# Patient Record
Sex: FEMALE | URBAN - METROPOLITAN AREA
[De-identification: names, ages, dates, MRNs, and addresses within clinical notes are randomized per-mention and may not be internally consistent; named-entity substitution may affect disease eponyms.]

---

## 2017-03-23 ENCOUNTER — INPATIENT (INPATIENT)
Facility: HOSPITAL | Age: 32
LOS: 1 days | Discharge: HOME | End: 2017-03-25
Attending: OBSTETRICS & GYNECOLOGY | Admitting: OBSTETRICS & GYNECOLOGY

## 2017-06-27 DIAGNOSIS — Z3A.37 37 WEEKS GESTATION OF PREGNANCY: ICD-10-CM

## 2017-06-27 DIAGNOSIS — Z33.1 PREGNANT STATE, INCIDENTAL: ICD-10-CM

## 2019-08-22 ENCOUNTER — APPOINTMENT (OUTPATIENT)
Dept: ANTEPARTUM | Facility: CLINIC | Age: 34
End: 2019-08-22
Payer: MEDICAID

## 2019-08-22 ENCOUNTER — ASOB RESULT (OUTPATIENT)
Age: 34
End: 2019-08-22

## 2019-08-22 PROCEDURE — 76811 OB US DETAILED SNGL FETUS: CPT

## 2019-12-24 ENCOUNTER — INPATIENT (INPATIENT)
Facility: HOSPITAL | Age: 34
LOS: 0 days | Discharge: HOME | End: 2019-12-25
Attending: OBSTETRICS & GYNECOLOGY | Admitting: OBSTETRICS & GYNECOLOGY
Payer: MEDICAID

## 2019-12-24 VITALS — DIASTOLIC BLOOD PRESSURE: 69 MMHG | HEART RATE: 84 BPM | SYSTOLIC BLOOD PRESSURE: 129 MMHG

## 2019-12-24 LAB
AMPHET UR-MCNC: NEGATIVE — SIGNIFICANT CHANGE UP
APPEARANCE UR: CLEAR — SIGNIFICANT CHANGE UP
BACTERIA # UR AUTO: NEGATIVE — SIGNIFICANT CHANGE UP
BARBITURATES UR SCN-MCNC: NEGATIVE — SIGNIFICANT CHANGE UP
BASOPHILS # BLD AUTO: 0.02 K/UL — SIGNIFICANT CHANGE UP (ref 0–0.2)
BASOPHILS NFR BLD AUTO: 0.2 % — SIGNIFICANT CHANGE UP (ref 0–1)
BENZODIAZ UR-MCNC: NEGATIVE — SIGNIFICANT CHANGE UP
BILIRUB UR-MCNC: NEGATIVE — SIGNIFICANT CHANGE UP
BLD GP AB SCN SERPL QL: SIGNIFICANT CHANGE UP
BUPRENORPHINE SCREEN, URINE RESULT: NEGATIVE — SIGNIFICANT CHANGE UP
COCAINE METAB.OTHER UR-MCNC: NEGATIVE — SIGNIFICANT CHANGE UP
COLOR SPEC: SIGNIFICANT CHANGE UP
DIFF PNL FLD: NEGATIVE — SIGNIFICANT CHANGE UP
EOSINOPHIL # BLD AUTO: 0.04 K/UL — SIGNIFICANT CHANGE UP (ref 0–0.7)
EOSINOPHIL NFR BLD AUTO: 0.5 % — SIGNIFICANT CHANGE UP (ref 0–8)
EPI CELLS # UR: 11 /HPF — HIGH (ref 0–5)
FENTANYL UR QL: NEGATIVE — SIGNIFICANT CHANGE UP
GLUCOSE UR QL: NEGATIVE — SIGNIFICANT CHANGE UP
HCT VFR BLD CALC: 33.5 % — LOW (ref 37–47)
HGB BLD-MCNC: 11.3 G/DL — LOW (ref 12–16)
HYALINE CASTS # UR AUTO: 4 /LPF — SIGNIFICANT CHANGE UP (ref 0–7)
IMM GRANULOCYTES NFR BLD AUTO: 0.6 % — HIGH (ref 0.1–0.3)
KETONES UR-MCNC: NEGATIVE — SIGNIFICANT CHANGE UP
L&D DRUG SCREEN, URINE: SIGNIFICANT CHANGE UP
LEUKOCYTE ESTERASE UR-ACNC: ABNORMAL
LYMPHOCYTES # BLD AUTO: 1.64 K/UL — SIGNIFICANT CHANGE UP (ref 1.2–3.4)
LYMPHOCYTES # BLD AUTO: 19.2 % — LOW (ref 20.5–51.1)
MCHC RBC-ENTMCNC: 30.8 PG — SIGNIFICANT CHANGE UP (ref 27–31)
MCHC RBC-ENTMCNC: 33.7 G/DL — SIGNIFICANT CHANGE UP (ref 32–37)
MCV RBC AUTO: 91.3 FL — SIGNIFICANT CHANGE UP (ref 81–99)
METHADONE UR-MCNC: NEGATIVE — SIGNIFICANT CHANGE UP
MONOCYTES # BLD AUTO: 0.57 K/UL — SIGNIFICANT CHANGE UP (ref 0.1–0.6)
MONOCYTES NFR BLD AUTO: 6.7 % — SIGNIFICANT CHANGE UP (ref 1.7–9.3)
NEUTROPHILS # BLD AUTO: 6.23 K/UL — SIGNIFICANT CHANGE UP (ref 1.4–6.5)
NEUTROPHILS NFR BLD AUTO: 72.8 % — SIGNIFICANT CHANGE UP (ref 42.2–75.2)
NITRITE UR-MCNC: NEGATIVE — SIGNIFICANT CHANGE UP
NRBC # BLD: 0 /100 WBCS — SIGNIFICANT CHANGE UP (ref 0–0)
OPIATES UR-MCNC: NEGATIVE — SIGNIFICANT CHANGE UP
OXYCODONE UR-MCNC: NEGATIVE — SIGNIFICANT CHANGE UP
PCP UR-MCNC: NEGATIVE — SIGNIFICANT CHANGE UP
PH UR: 6 — SIGNIFICANT CHANGE UP (ref 5–8)
PLATELET # BLD AUTO: 249 K/UL — SIGNIFICANT CHANGE UP (ref 130–400)
PRENATAL SYPHILIS TEST: SIGNIFICANT CHANGE UP
PROPOXYPHENE QUALITATIVE URINE RESULT: NEGATIVE — SIGNIFICANT CHANGE UP
PROT UR-MCNC: NEGATIVE — SIGNIFICANT CHANGE UP
RBC # BLD: 3.67 M/UL — LOW (ref 4.2–5.4)
RBC # FLD: 12.8 % — SIGNIFICANT CHANGE UP (ref 11.5–14.5)
RBC CASTS # UR COMP ASSIST: 1 /HPF — SIGNIFICANT CHANGE UP (ref 0–4)
SP GR SPEC: 1.01 — SIGNIFICANT CHANGE UP (ref 1.01–1.02)
UROBILINOGEN FLD QL: SIGNIFICANT CHANGE UP
WBC # BLD: 8.55 K/UL — SIGNIFICANT CHANGE UP (ref 4.8–10.8)
WBC # FLD AUTO: 8.55 K/UL — SIGNIFICANT CHANGE UP (ref 4.8–10.8)
WBC UR QL: 2 /HPF — SIGNIFICANT CHANGE UP (ref 0–5)

## 2019-12-24 PROCEDURE — 59400 OBSTETRICAL CARE: CPT | Mod: U9

## 2019-12-24 RX ORDER — SODIUM CHLORIDE 9 MG/ML
1000 INJECTION, SOLUTION INTRAVENOUS
Refills: 0 | Status: DISCONTINUED | OUTPATIENT
Start: 2019-12-24 | End: 2019-12-24

## 2019-12-24 RX ORDER — ACETAMINOPHEN 500 MG
975 TABLET ORAL
Refills: 0 | Status: DISCONTINUED | OUTPATIENT
Start: 2019-12-24 | End: 2019-12-25

## 2019-12-24 RX ORDER — MAGNESIUM HYDROXIDE 400 MG/1
30 TABLET, CHEWABLE ORAL
Refills: 0 | Status: DISCONTINUED | OUTPATIENT
Start: 2019-12-24 | End: 2019-12-25

## 2019-12-24 RX ORDER — DIPHENHYDRAMINE HCL 50 MG
25 CAPSULE ORAL EVERY 6 HOURS
Refills: 0 | Status: DISCONTINUED | OUTPATIENT
Start: 2019-12-24 | End: 2019-12-25

## 2019-12-24 RX ORDER — KETOROLAC TROMETHAMINE 30 MG/ML
30 SYRINGE (ML) INJECTION ONCE
Refills: 0 | Status: DISCONTINUED | OUTPATIENT
Start: 2019-12-24 | End: 2019-12-24

## 2019-12-24 RX ORDER — AER TRAVELER 0.5 G/1
1 SOLUTION RECTAL; TOPICAL EVERY 4 HOURS
Refills: 0 | Status: DISCONTINUED | OUTPATIENT
Start: 2019-12-24 | End: 2019-12-25

## 2019-12-24 RX ORDER — DIBUCAINE 1 %
1 OINTMENT (GRAM) RECTAL EVERY 6 HOURS
Refills: 0 | Status: DISCONTINUED | OUTPATIENT
Start: 2019-12-24 | End: 2019-12-25

## 2019-12-24 RX ORDER — SODIUM CHLORIDE 9 MG/ML
3 INJECTION INTRAMUSCULAR; INTRAVENOUS; SUBCUTANEOUS EVERY 8 HOURS
Refills: 0 | Status: DISCONTINUED | OUTPATIENT
Start: 2019-12-24 | End: 2019-12-25

## 2019-12-24 RX ORDER — IBUPROFEN 200 MG
600 TABLET ORAL EVERY 6 HOURS
Refills: 0 | Status: DISCONTINUED | OUTPATIENT
Start: 2019-12-24 | End: 2019-12-25

## 2019-12-24 RX ORDER — OXYTOCIN 10 UNIT/ML
333.33 VIAL (ML) INJECTION
Qty: 20 | Refills: 0 | Status: DISCONTINUED | OUTPATIENT
Start: 2019-12-24 | End: 2019-12-24

## 2019-12-24 RX ORDER — OXYCODONE HYDROCHLORIDE 5 MG/1
5 TABLET ORAL
Refills: 0 | Status: DISCONTINUED | OUTPATIENT
Start: 2019-12-24 | End: 2019-12-25

## 2019-12-24 RX ORDER — HYDROCORTISONE 1 %
1 OINTMENT (GRAM) TOPICAL EVERY 6 HOURS
Refills: 0 | Status: DISCONTINUED | OUTPATIENT
Start: 2019-12-24 | End: 2019-12-25

## 2019-12-24 RX ORDER — OXYCODONE HYDROCHLORIDE 5 MG/1
5 TABLET ORAL ONCE
Refills: 0 | Status: DISCONTINUED | OUTPATIENT
Start: 2019-12-24 | End: 2019-12-25

## 2019-12-24 RX ORDER — BENZOCAINE 10 %
1 GEL (GRAM) MUCOUS MEMBRANE EVERY 6 HOURS
Refills: 0 | Status: DISCONTINUED | OUTPATIENT
Start: 2019-12-24 | End: 2019-12-25

## 2019-12-24 RX ORDER — SIMETHICONE 80 MG/1
80 TABLET, CHEWABLE ORAL EVERY 4 HOURS
Refills: 0 | Status: DISCONTINUED | OUTPATIENT
Start: 2019-12-24 | End: 2019-12-25

## 2019-12-24 RX ORDER — LANOLIN
1 OINTMENT (GRAM) TOPICAL EVERY 6 HOURS
Refills: 0 | Status: DISCONTINUED | OUTPATIENT
Start: 2019-12-24 | End: 2019-12-25

## 2019-12-24 RX ORDER — IBUPROFEN 200 MG
600 TABLET ORAL EVERY 6 HOURS
Refills: 0 | Status: COMPLETED | OUTPATIENT
Start: 2019-12-24 | End: 2020-11-21

## 2019-12-24 RX ORDER — AMPICILLIN TRIHYDRATE 250 MG
1 CAPSULE ORAL EVERY 4 HOURS
Refills: 0 | Status: DISCONTINUED | OUTPATIENT
Start: 2019-12-24 | End: 2019-12-24

## 2019-12-24 RX ORDER — AMPICILLIN TRIHYDRATE 250 MG
2 CAPSULE ORAL ONCE
Refills: 0 | Status: COMPLETED | OUTPATIENT
Start: 2019-12-24 | End: 2019-12-24

## 2019-12-24 RX ORDER — OXYTOCIN 10 UNIT/ML
333.33 VIAL (ML) INJECTION
Qty: 20 | Refills: 0 | Status: DISCONTINUED | OUTPATIENT
Start: 2019-12-24 | End: 2019-12-25

## 2019-12-24 RX ADMIN — SODIUM CHLORIDE 3 MILLILITER(S): 9 INJECTION INTRAMUSCULAR; INTRAVENOUS; SUBCUTANEOUS at 20:50

## 2019-12-24 RX ADMIN — Medication 975 MILLIGRAM(S): at 19:42

## 2019-12-24 RX ADMIN — Medication 30 MILLIGRAM(S): at 10:23

## 2019-12-24 RX ADMIN — Medication 600 MILLIGRAM(S): at 18:11

## 2019-12-24 RX ADMIN — SODIUM CHLORIDE 3 MILLILITER(S): 9 INJECTION INTRAMUSCULAR; INTRAVENOUS; SUBCUTANEOUS at 14:54

## 2019-12-24 RX ADMIN — Medication 600 MILLIGRAM(S): at 23:10

## 2019-12-24 RX ADMIN — Medication 108 GRAM(S): at 09:24

## 2019-12-24 RX ADMIN — Medication 975 MILLIGRAM(S): at 20:51

## 2019-12-24 RX ADMIN — Medication 600 MILLIGRAM(S): at 19:42

## 2019-12-24 RX ADMIN — Medication 30 MILLIGRAM(S): at 11:07

## 2019-12-24 RX ADMIN — Medication 975 MILLIGRAM(S): at 16:13

## 2019-12-24 RX ADMIN — Medication 216 GRAM(S): at 05:11

## 2019-12-24 NOTE — OB RN DELIVERY SUMMARY - NS_GENERALBABYACOMMENTA_OBGYN_ALL_OB_FT
report given to [facility] - -baby has been skin to skin- breast fed- + gbs- received 2 doses of Ampicillin

## 2019-12-24 NOTE — OB PROVIDER H&P - NSHPPHYSICALEXAM_GEN_ALL_CORE
Vital Signs Last 24 Hrs  T(F): 97.9 (24 Dec 2019 04:12), Max: 97.9 (24 Dec 2019 04:07)  HR: 84 (24 Dec 2019 04:12) (84 - 84)  BP: 129/69 (24 Dec 2019 04:12) (129/69 - 129/69)  RR: 16 (24 Dec 2019 04:12) (16 - 16)    Gen: AOx3, no acute distress  Abd: soft, gravid, non tender, mildly palpable contractions  Ext: No edema bilaterally    EFM: 130/mod/+accels; cat 1  [place]: q4 min  SVE: 6/90/-1 vtx intact

## 2019-12-24 NOTE — OB PROVIDER H&P - ASSESSMENT
A/P: 35yo  at 38w2d, GBS positive, in active labor    - admit to L&D  - ampicillin for GBS prophylaxis  - f/u admission labs  - cont EFM and toco  - pain management PRN  - IV hydration, clear liquid diet  - monitor vitals    Dr. Fernandez and Dr. Goncalves aware.

## 2019-12-24 NOTE — OB PROVIDER H&P - NS_OBGYNHISTORY_OBGYN_ALL_OB_FT
ObHx: FT NSVDx4, largest baby [id number]    GynHx: Denies hx of fibroids, cysts, abnormal paps or STIs

## 2019-12-24 NOTE — PROGRESS NOTE ADULT - ASSESSMENT
A/P: 35yo  at 38w2d GA, GBS positive, s/p epidural, in labor progressing well  -continue current management  -pain management prn   -continous EFM/toco   -GBS prophylaxis: ampicillin 1g q4hrs  -diet: CLD  -IVF hydration   -f/u UDS     Dr. Vo and Dr. Goncalves aware

## 2019-12-24 NOTE — OB RN DELIVERY SUMMARY - NS_SEPSISRSKCALC_OBGYN_ALL_OB_FT
EOS calculated successfully. EOS Risk Factor: 0.5/1000 live births ([facility] national incidence); GA=38w2d; Temp=98.1; ROM=0.4; GBS='Positive'; Antibiotics='GBS specific antibiotics > 2 hrs prior to birth'

## 2019-12-24 NOTE — OB PROVIDER H&P - HISTORY OF PRESENT ILLNESS
33yo  at 38w2d EDC 20 by LMP c/w first trimester sono presents to L&D with contractions that started @0100, are every few mins, and a 6/10 in intensity. Denies any LOF or VB. Good fetal movement. Denies any complications with this pregnancy. GBS positive.

## 2019-12-24 NOTE — OB PROVIDER DELIVERY SUMMARY - NSPROVIDERDELIVERYNOTE_OBGYN_ALL_OB_FT
Patient was fully dilated and pushing. Fetal head was OA and restituted to ROT. The anterior and posterior shoulders delivered, followed by the remaining body atraumatically. Delayed cord clamping was performed, and then clamped and cut. Cord blood gases collected x2. The  was handed to the mother and RN. The placenta delivered intact with membranes. Pitocin was administered. Uterus massaged, fundus found to be firm. Cervix, vagina and perineum inspected no lacerations noted.    Viable female infant delivered, with APGARs 9/9      Dr. Goncalves present for the delivery

## 2019-12-24 NOTE — OB PROVIDER H&P - NSHPLABSRESULTS_GEN_ALL_CORE
5/21/19  Measles immune  varicella immune  mumps immune  lead <1   HIV NR    GCT 72    SONOGRAMS  11w2d: SIUP, +FH, CRL 48mm c/w LMP, small simple right ovarian cyst   20w4d: AGA, fundal placenta, MVP normal, normal anatomy CL 4.12cm, normal ovaries bilaterally

## 2019-12-25 ENCOUNTER — TRANSCRIPTION ENCOUNTER (OUTPATIENT)
Age: 34
End: 2019-12-25

## 2019-12-25 VITALS
DIASTOLIC BLOOD PRESSURE: 50 MMHG | HEART RATE: 70 BPM | SYSTOLIC BLOOD PRESSURE: 98 MMHG | TEMPERATURE: 96 F | RESPIRATION RATE: 18 BRPM

## 2019-12-25 LAB
BASOPHILS # BLD AUTO: 0.03 K/UL — SIGNIFICANT CHANGE UP (ref 0–0.2)
BASOPHILS NFR BLD AUTO: 0.3 % — SIGNIFICANT CHANGE UP (ref 0–1)
EOSINOPHIL # BLD AUTO: 0.19 K/UL — SIGNIFICANT CHANGE UP (ref 0–0.7)
EOSINOPHIL NFR BLD AUTO: 2 % — SIGNIFICANT CHANGE UP (ref 0–8)
HCT VFR BLD CALC: 29.5 % — LOW (ref 37–47)
HGB BLD-MCNC: 9.6 G/DL — LOW (ref 12–16)
IMM GRANULOCYTES NFR BLD AUTO: 0.3 % — SIGNIFICANT CHANGE UP (ref 0.1–0.3)
LYMPHOCYTES # BLD AUTO: 1.7 K/UL — SIGNIFICANT CHANGE UP (ref 1.2–3.4)
LYMPHOCYTES # BLD AUTO: 17.8 % — LOW (ref 20.5–51.1)
MCHC RBC-ENTMCNC: 30.8 PG — SIGNIFICANT CHANGE UP (ref 27–31)
MCHC RBC-ENTMCNC: 32.5 G/DL — SIGNIFICANT CHANGE UP (ref 32–37)
MCV RBC AUTO: 94.6 FL — SIGNIFICANT CHANGE UP (ref 81–99)
MONOCYTES # BLD AUTO: 0.56 K/UL — SIGNIFICANT CHANGE UP (ref 0.1–0.6)
MONOCYTES NFR BLD AUTO: 5.9 % — SIGNIFICANT CHANGE UP (ref 1.7–9.3)
NEUTROPHILS # BLD AUTO: 7.05 K/UL — HIGH (ref 1.4–6.5)
NEUTROPHILS NFR BLD AUTO: 73.7 % — SIGNIFICANT CHANGE UP (ref 42.2–75.2)
NRBC # BLD: 0 /100 WBCS — SIGNIFICANT CHANGE UP (ref 0–0)
PLATELET # BLD AUTO: 212 K/UL — SIGNIFICANT CHANGE UP (ref 130–400)
RBC # BLD: 3.12 M/UL — LOW (ref 4.2–5.4)
RBC # FLD: 13.1 % — SIGNIFICANT CHANGE UP (ref 11.5–14.5)
WBC # BLD: 9.56 K/UL — SIGNIFICANT CHANGE UP (ref 4.8–10.8)
WBC # FLD AUTO: 9.56 K/UL — SIGNIFICANT CHANGE UP (ref 4.8–10.8)

## 2019-12-25 RX ADMIN — SODIUM CHLORIDE 3 MILLILITER(S): 9 INJECTION INTRAMUSCULAR; INTRAVENOUS; SUBCUTANEOUS at 05:10

## 2019-12-25 RX ADMIN — Medication 600 MILLIGRAM(S): at 05:11

## 2019-12-25 RX ADMIN — Medication 975 MILLIGRAM(S): at 09:21

## 2019-12-25 NOTE — PROGRESS NOTE ADULT - SUBJECTIVE AND OBJECTIVE BOX
OB attending  PPD #1    Pt doing well, pain well controlled. No overnight events, no acute complaints.    Ambulating: Yes  Voiding: Yes  Flatus: Yes  Bowel movements: Yes   Breast or bottle feeding: Breastfeeding  Diet: Regular    PAST MEDICAL & SURGICAL HISTORY:  No pertinent past medical history  No significant past surgical history      Physical Exam  Vital Signs Last 24 Hrs  T(C): 35.8 (25 Dec 2019 07:35), Max: 37.1 (24 Dec 2019 14:01)  T(F): 96.5 (25 Dec 2019 07:35), Max: 98.7 (24 Dec 2019 14:01)  HR: 70 (25 Dec 2019 07:35) (68 - 88)  BP: 98/50 (25 Dec 2019 07:35) (94/55 - 106/56)  BP(mean): --  RR: 18 (25 Dec 2019 07:35) (17 - 18)  SpO2: --  Gen: AAOx3, NAD  Abd: Soft, nontender, nondistended, BS+  Fundus: Firm, below umbilicus  Lochia: normal  Ext: No calf tenderness, no swelling    Labs:                        9.6    9.56  )-----------( 212      ( 25 Dec 2019 08:09 )             29.5         A/P:  s/p , PPD #1, doing well  - continue current management  -d/c in Am
PGY1 Note    Patient seen at bedside. No complaints at the moment.    T(F): 98.1 ( @ 05:14), Max: 98.1 ( @ 05:14)  HR: 81 ( @ 08:01)  BP: 96/51 ( @ 08:01) (87/49 - 129/69)  RR: 18 ( @ 05:14)    EFM: 125bpm/moderate variability/+accels  TOCO: q5mins  SVE: deferred     Medications:  ampicillin  IVPB: 216 ( @ 05:11)      Labs:                        11.3   8.55  )-----------( 249      ( 24 Dec 2019 05:15 )             33.5           Prenatal Syphilis Test: Nonreact ( @ 05:15)  Antibody Screen: NEG (19 @ 05:15)    Urinalysis Basic - ( 24 Dec 2019 05:15 )    Color: Light Yellow / Appearance: Clear / S.010 / pH: x  Gluc: x / Ketone: Negative  / Bili: Negative / Urobili: <2 mg/dL   Blood: x / Protein: Negative / Nitrite: Negative   Leuk Esterase: Moderate / RBC: 1 /HPF / WBC 2 /HPF   Sq Epi: x / Non Sq Epi: 11 /HPF / Bacteria: Negative

## 2019-12-25 NOTE — DISCHARGE NOTE OB - PATIENT PORTAL LINK FT
You can access the FollowMyHealth Patient Portal offered by [facility] by registering at the following website: [email or web address]. By joining [facility]’s FollowMyHealth portal, you will also be able to view your health information using other applications (apps) compatible with our system.

## 2019-12-25 NOTE — DISCHARGE NOTE OB - CARE PROVIDER_API CALL
Qasim Goncalves)  Obstetrics and Gynecology  [address] [place] [address]  Phone: [phone]  Fax: [phone]  Established Patient  Follow Up Time:

## 2019-12-29 DIAGNOSIS — Z3A.38 38 WEEKS GESTATION OF PREGNANCY: ICD-10-CM

## 2020-08-31 NOTE — OB PROVIDER H&P - NSLASTDATEVISIT_OBGYN_ALL_OB
1. Start metformin  mg twice daily with meals  2. Increase tresiba 70 units daily  3. Continue farxiga 10 every day, victoza 1.8 daily, humalog 15 units three times daily with meals  4. Choose healthier options if deciding to order takeout and work on walking 3 times weekly  5. Follow up in 1 month  
Known

## 2022-10-03 DIAGNOSIS — R11.0 NAUSEA: ICD-10-CM

## 2022-10-12 ENCOUNTER — APPOINTMENT (OUTPATIENT)
Dept: OBGYN | Facility: CLINIC | Age: 37
End: 2022-10-12

## 2022-10-12 VITALS
BODY MASS INDEX: 18.43 KG/M2 | WEIGHT: 104 LBS | SYSTOLIC BLOOD PRESSURE: 111 MMHG | HEIGHT: 63 IN | DIASTOLIC BLOOD PRESSURE: 69 MMHG

## 2022-10-12 DIAGNOSIS — O36.80X0 PREGNANCY WITH INCONCLUSIVE FETAL VIABILITY, NOT APPLICABLE OR UNSPECIFIED: ICD-10-CM

## 2022-10-12 DIAGNOSIS — Z32.00 ENCOUNTER FOR PREGNANCY TEST, RESULT UNKNOWN: ICD-10-CM

## 2022-10-12 LAB
BILIRUB UR QL STRIP: NORMAL
CLARITY UR: CLEAR
COLLECTION METHOD: NORMAL
GLUCOSE UR-MCNC: NORMAL
HCG UR QL: 0.2 EU/DL
HCG UR QL: POSITIVE
HGB UR QL STRIP.AUTO: NORMAL
KETONES UR-MCNC: NORMAL
LEUKOCYTE ESTERASE UR QL STRIP: NORMAL
NITRITE UR QL STRIP: NORMAL
PH UR STRIP: 7
PROT UR STRIP-MCNC: NORMAL
SP GR UR STRIP: 1.02

## 2022-10-12 PROCEDURE — 81025 URINE PREGNANCY TEST: CPT

## 2022-10-12 PROCEDURE — 76817 TRANSVAGINAL US OBSTETRIC: CPT

## 2022-10-12 PROCEDURE — 99213 OFFICE O/P EST LOW 20 MIN: CPT | Mod: 25

## 2022-10-12 PROCEDURE — 81003 URINALYSIS AUTO W/O SCOPE: CPT | Mod: QW

## 2022-10-12 NOTE — HISTORY OF PRESENT ILLNESS
[Yes] : pregnancy [FreeTextEntry1] : 36 yo  all  usu delivers at 38 wks,  last baby is almost 3 yrs old she nursed for 12 months then cycles returned and pt had a short period interval  Pt having sig nausea and vomiting. already on zofran 8 mg BID [TextBox_6] : 08/18/22

## 2022-10-12 NOTE — DISCUSSION/SUMMARY
[FreeTextEntry1] : 38 yo  now pregnant size = [facility] 05/25/2023 pt on zofran, has previously tried diclegis but it did not help.

## 2022-10-12 NOTE — PROCEDURE
[Transvaginal OB Sonogram] : Transvaginal OB Sonogram [Intrauterine Pregnancy] : intrauterine pregnancy [Yolk Sac] : yolk sac present [Fetal Heart] : fetal heart present [CRL: ___ (mm)] : CRL - [unfilled]Umm [Current GA by Sonogram: ___ (wks)] : Current GA by Sonogram: [unfilled]Uwks [___ day(s)] : [unfilled] days [Transvaginal OB Sonogram WNL] : Transvaginal OB Sonogram WNL

## 2022-10-13 PROBLEM — O36.80X0 ENCOUNTER TO DETERMINE FETAL VIABILITY OF PREGNANCY, SINGLE OR UNSPECIFIED FETUS: Status: RESOLVED | Noted: 2022-10-13 | Resolved: 2023-01-11

## 2022-10-13 LAB
C TRACH RRNA SPEC QL NAA+PROBE: NOT DETECTED
N GONORRHOEA RRNA SPEC QL NAA+PROBE: NOT DETECTED
SOURCE AMPLIFICATION: NORMAL

## 2022-10-16 LAB — BACTERIA UR CULT: NORMAL

## 2022-11-17 ENCOUNTER — APPOINTMENT (OUTPATIENT)
Dept: OBGYN | Facility: CLINIC | Age: 37
End: 2022-11-17

## 2022-11-17 VITALS — WEIGHT: 105 LBS | BODY MASS INDEX: 18.6 KG/M2 | DIASTOLIC BLOOD PRESSURE: 66 MMHG | SYSTOLIC BLOOD PRESSURE: 111 MMHG

## 2022-11-17 LAB
BILIRUB UR QL STRIP: NORMAL
GLUCOSE UR-MCNC: NORMAL
HCG UR QL: 0.2 EU/DL
HGB UR QL STRIP.AUTO: NORMAL
KETONES UR-MCNC: NORMAL
LEUKOCYTE ESTERASE UR QL STRIP: NORMAL
NITRITE UR QL STRIP: NORMAL
PH UR STRIP: 5.5
PROT UR STRIP-MCNC: NORMAL
SP GR UR STRIP: 1.03

## 2022-11-17 PROCEDURE — 0501F PRENATAL FLOW SHEET: CPT | Mod: NC

## 2022-11-17 PROCEDURE — 76801 OB US < 14 WKS SINGLE FETUS: CPT

## 2022-11-17 PROCEDURE — 81003 URINALYSIS AUTO W/O SCOPE: CPT | Mod: QW

## 2022-11-18 LAB
ABO + RH PNL BLD: NORMAL
BASOPHILS # BLD AUTO: 0.02 K/UL
BASOPHILS NFR BLD AUTO: 0.2 %
BLD GP AB SCN SERPL QL: NORMAL
EOSINOPHIL # BLD AUTO: 0.1 K/UL
EOSINOPHIL NFR BLD AUTO: 0.9 %
HBV SURFACE AG SER QL: NONREACTIVE
HCT VFR BLD CALC: 33.6 %
HCV AB SER QL: NONREACTIVE
HCV S/CO RATIO: 0.08 S/CO
HGB BLD-MCNC: 10.8 G/DL
HIV1+2 AB SPEC QL IA.RAPID: NONREACTIVE
IMM GRANULOCYTES NFR BLD AUTO: 0.4 %
LYMPHOCYTES # BLD AUTO: 1.78 K/UL
LYMPHOCYTES NFR BLD AUTO: 16.1 %
MAN DIFF?: NORMAL
MCHC RBC-ENTMCNC: 31.2 PG
MCHC RBC-ENTMCNC: 32.1 GM/DL
MCV RBC AUTO: 97.1 FL
MEV IGG FLD QL IA: 100 AU/ML
MEV IGG+IGM SER-IMP: POSITIVE
MONOCYTES # BLD AUTO: 0.73 K/UL
MONOCYTES NFR BLD AUTO: 6.6 %
MUV AB SER-ACNC: NEGATIVE
MUV IGG SER QL IA: 8.9 AU/ML
NEUTROPHILS # BLD AUTO: 8.38 K/UL
NEUTROPHILS NFR BLD AUTO: 75.8 %
PLATELET # BLD AUTO: 294 K/UL
RBC # BLD: 3.46 M/UL
RBC # FLD: 13.7 %
RUBV IGG FLD-ACNC: 5.2 INDEX
RUBV IGG SER-IMP: POSITIVE
T PALLIDUM AB SER QL IA: NEGATIVE
VZV AB TITR SER: NEGATIVE
VZV IGG SER IF-ACNC: 81.7 INDEX
WBC # FLD AUTO: 11.05 K/UL

## 2022-11-21 LAB
B19V IGG SER QL IA: 3.31 INDEX
B19V IGG+IGM SER-IMP: NORMAL
B19V IGG+IGM SER-IMP: POSITIVE
B19V IGM FLD-ACNC: 0.24 INDEX
B19V IGM SER-ACNC: NEGATIVE
BACTERIA UR CULT: NORMAL
LEAD BLD-MCNC: <1 UG/DL

## 2022-11-29 ENCOUNTER — NON-APPOINTMENT (OUTPATIENT)
Age: 37
End: 2022-11-29

## 2022-11-29 NOTE — DISCUSSION/SUMMARY
[FreeTextEntry1] : DATE: NOVEMBER 29, 2022\par \par TO WHOM IT MAY CONCERN:\par \par Please be advised that Michelle Daisha Rodas is presently under my obstetrical care.\par \par Her estimated due date is 05/25/2023.\par \par If you need further information, please contact my office.

## 2022-12-01 ENCOUNTER — NON-APPOINTMENT (OUTPATIENT)
Age: 37
End: 2022-12-01

## 2022-12-14 ENCOUNTER — APPOINTMENT (OUTPATIENT)
Dept: OBGYN | Facility: CLINIC | Age: 37
End: 2022-12-14

## 2022-12-14 VITALS — SYSTOLIC BLOOD PRESSURE: 112 MMHG | WEIGHT: 108 LBS | BODY MASS INDEX: 19.13 KG/M2 | DIASTOLIC BLOOD PRESSURE: 72 MMHG

## 2022-12-14 LAB
BILIRUB UR QL STRIP: NORMAL
GLUCOSE UR-MCNC: NORMAL
HCG UR QL: 0.2 EU/DL
HGB UR QL STRIP.AUTO: NORMAL
KETONES UR-MCNC: NORMAL
LEUKOCYTE ESTERASE UR QL STRIP: NORMAL
NITRITE UR QL STRIP: NORMAL
PH UR STRIP: 7.5
PROT UR STRIP-MCNC: NORMAL
SP GR UR STRIP: 1.02

## 2022-12-14 PROCEDURE — 0502F SUBSEQUENT PRENATAL CARE: CPT | Mod: NC

## 2022-12-14 PROCEDURE — 81003 URINALYSIS AUTO W/O SCOPE: CPT | Mod: QW

## 2022-12-28 ENCOUNTER — TRANSCRIPTION ENCOUNTER (OUTPATIENT)
Age: 37
End: 2022-12-28

## 2023-01-11 ENCOUNTER — APPOINTMENT (OUTPATIENT)
Dept: OBGYN | Facility: CLINIC | Age: 38
End: 2023-01-11
Payer: MEDICAID

## 2023-01-11 VITALS — SYSTOLIC BLOOD PRESSURE: 109 MMHG | DIASTOLIC BLOOD PRESSURE: 69 MMHG | WEIGHT: 116 LBS | BODY MASS INDEX: 20.55 KG/M2

## 2023-01-11 LAB
BILIRUB UR QL STRIP: NORMAL
GLUCOSE UR-MCNC: NORMAL
HCG UR QL: 0.2 EU/DL
HGB UR QL STRIP.AUTO: NORMAL
KETONES UR-MCNC: NORMAL
LEUKOCYTE ESTERASE UR QL STRIP: NORMAL
NITRITE UR QL STRIP: NORMAL
PH UR STRIP: 6
PROT UR STRIP-MCNC: NORMAL
SP GR UR STRIP: 1.01

## 2023-01-11 PROCEDURE — 81003 URINALYSIS AUTO W/O SCOPE: CPT | Mod: QW

## 2023-01-11 PROCEDURE — 0502F SUBSEQUENT PRENATAL CARE: CPT | Mod: NC

## 2023-02-14 ENCOUNTER — APPOINTMENT (OUTPATIENT)
Dept: OBGYN | Facility: CLINIC | Age: 38
End: 2023-02-14
Payer: MEDICAID

## 2023-02-14 VITALS — WEIGHT: 124 LBS | SYSTOLIC BLOOD PRESSURE: 106 MMHG | DIASTOLIC BLOOD PRESSURE: 70 MMHG | BODY MASS INDEX: 21.97 KG/M2

## 2023-02-14 LAB
BILIRUB UR QL STRIP: NORMAL
GLUCOSE UR-MCNC: NORMAL
HCG UR QL: 0.2 EU/DL
HGB UR QL STRIP.AUTO: NORMAL
KETONES UR-MCNC: NORMAL
LEUKOCYTE ESTERASE UR QL STRIP: NORMAL
NITRITE UR QL STRIP: NORMAL
PH UR STRIP: 7
PROT UR STRIP-MCNC: NORMAL
SP GR UR STRIP: 1.01

## 2023-02-14 PROCEDURE — 0502F SUBSEQUENT PRENATAL CARE: CPT | Mod: NC

## 2023-02-14 PROCEDURE — 81003 URINALYSIS AUTO W/O SCOPE: CPT | Mod: QW

## 2023-02-16 LAB
BASOPHILS # BLD AUTO: 0.03 K/UL
BASOPHILS NFR BLD AUTO: 0.3 %
EOSINOPHIL # BLD AUTO: 0.11 K/UL
EOSINOPHIL NFR BLD AUTO: 1.1 %
GLUCOSE 1H P 50 G GLC PO SERPL-MCNC: 85 MG/DL
HCT VFR BLD CALC: 34.5 %
HGB BLD-MCNC: 11.6 G/DL
IMM GRANULOCYTES NFR BLD AUTO: 0.4 %
LYMPHOCYTES # BLD AUTO: 1.57 K/UL
LYMPHOCYTES NFR BLD AUTO: 16.1 %
MAN DIFF?: NORMAL
MCHC RBC-ENTMCNC: 32.5 PG
MCHC RBC-ENTMCNC: 33.6 GM/DL
MCV RBC AUTO: 96.6 FL
MONOCYTES # BLD AUTO: 0.66 K/UL
MONOCYTES NFR BLD AUTO: 6.8 %
NEUTROPHILS # BLD AUTO: 7.35 K/UL
NEUTROPHILS NFR BLD AUTO: 75.3 %
PLATELET # BLD AUTO: 250 K/UL
RBC # BLD: 3.57 M/UL
RBC # FLD: 13.5 %
WBC # FLD AUTO: 9.76 K/UL

## 2023-03-10 NOTE — OB RN PATIENT PROFILE - BREAST MILK PROVIDES PROTECTION AGAINST INFECTION
Discharge Planning Note:    - [facility] - ACCEPTED    - Pre-Cert - STARTED    [employer]; [employer] is intending to deny and is offering:    [id number]    - Phone: [phone] Opt: 5    Zbgs-hi-Mxhi needs to be completed by: 12:30 pm EST today 03/10/2023    Will need the following information:    Patient's Name: Ivon Lyn  : 1936  Member ID Number: [id number]    Will continue to follow.    URMILA Boucher RN    [facility]  Phone: [phone]     Statement Selected

## 2023-03-22 ENCOUNTER — APPOINTMENT (OUTPATIENT)
Dept: OBGYN | Facility: CLINIC | Age: 38
End: 2023-03-22
Payer: MEDICAID

## 2023-03-22 VITALS — BODY MASS INDEX: 22.32 KG/M2 | WEIGHT: 126 LBS | SYSTOLIC BLOOD PRESSURE: 117 MMHG | DIASTOLIC BLOOD PRESSURE: 67 MMHG

## 2023-03-22 LAB
BILIRUB UR QL STRIP: NORMAL
GLUCOSE UR-MCNC: NORMAL
HCG UR QL: 0.2 EU/DL
HGB UR QL STRIP.AUTO: NORMAL
KETONES UR-MCNC: NORMAL
LEUKOCYTE ESTERASE UR QL STRIP: NORMAL
NITRITE UR QL STRIP: NORMAL
PH UR STRIP: 6.5
PROT UR STRIP-MCNC: NORMAL
SP GR UR STRIP: 1.01

## 2023-03-22 PROCEDURE — 0502F SUBSEQUENT PRENATAL CARE: CPT | Mod: NC

## 2023-03-22 PROCEDURE — 81003 URINALYSIS AUTO W/O SCOPE: CPT | Mod: QW

## 2023-04-17 ENCOUNTER — APPOINTMENT (OUTPATIENT)
Dept: OBGYN | Facility: CLINIC | Age: 38
End: 2023-04-17
Payer: MEDICAID

## 2023-04-17 VITALS — DIASTOLIC BLOOD PRESSURE: 73 MMHG | BODY MASS INDEX: 22.85 KG/M2 | SYSTOLIC BLOOD PRESSURE: 115 MMHG | WEIGHT: 129 LBS

## 2023-04-17 LAB
BILIRUB UR QL STRIP: NORMAL
GLUCOSE UR-MCNC: NORMAL
HCG UR QL: 1 EU/DL
HGB UR QL STRIP.AUTO: NORMAL
KETONES UR-MCNC: NORMAL
LEUKOCYTE ESTERASE UR QL STRIP: NORMAL
NITRITE UR QL STRIP: NORMAL
PH UR STRIP: 7.5
PROT UR STRIP-MCNC: NORMAL
SP GR UR STRIP: 1.01

## 2023-04-17 PROCEDURE — 76816 OB US FOLLOW-UP PER FETUS: CPT

## 2023-04-17 PROCEDURE — 0502F SUBSEQUENT PRENATAL CARE: CPT | Mod: NC

## 2023-04-17 PROCEDURE — 81003 URINALYSIS AUTO W/O SCOPE: CPT | Mod: QW

## 2023-05-02 ENCOUNTER — APPOINTMENT (OUTPATIENT)
Dept: OBGYN | Facility: CLINIC | Age: 38
End: 2023-05-02
Payer: MEDICAID

## 2023-05-02 VITALS — BODY MASS INDEX: 23.21 KG/M2 | SYSTOLIC BLOOD PRESSURE: 100 MMHG | DIASTOLIC BLOOD PRESSURE: 62 MMHG | WEIGHT: 131 LBS

## 2023-05-02 LAB
BILIRUB UR QL STRIP: NORMAL
GLUCOSE UR-MCNC: NORMAL
HCG UR QL: 0.2 EU/DL
HGB UR QL STRIP.AUTO: NORMAL
KETONES UR-MCNC: NORMAL
LEUKOCYTE ESTERASE UR QL STRIP: NORMAL
NITRITE UR QL STRIP: NORMAL
PH UR STRIP: 7.5
PROT UR STRIP-MCNC: NORMAL
SP GR UR STRIP: 1.01

## 2023-05-02 PROCEDURE — 81003 URINALYSIS AUTO W/O SCOPE: CPT | Mod: QW

## 2023-05-02 PROCEDURE — 0502F SUBSEQUENT PRENATAL CARE: CPT | Mod: NC

## 2023-05-03 ENCOUNTER — OUTPATIENT (OUTPATIENT)
Dept: INPATIENT UNIT | Facility: HOSPITAL | Age: 38
LOS: 1 days | Discharge: HOME | End: 2023-05-03
Payer: MEDICAID

## 2023-05-03 VITALS
DIASTOLIC BLOOD PRESSURE: 72 MMHG | TEMPERATURE: 99 F | SYSTOLIC BLOOD PRESSURE: 116 MMHG | HEART RATE: 83 BPM | RESPIRATION RATE: 20 BRPM

## 2023-05-03 VITALS — DIASTOLIC BLOOD PRESSURE: 72 MMHG | HEART RATE: 83 BPM | SYSTOLIC BLOOD PRESSURE: 116 MMHG

## 2023-05-03 DIAGNOSIS — O42.92 FULL-TERM PREMATURE RUPTURE OF MEMBRANES, UNSPECIFIED AS TO LENGTH OF TIME BETWEEN RUPTURE AND ONSET OF LABOR: ICD-10-CM

## 2023-05-03 LAB
BASOPHILS # BLD AUTO: 0.02 K/UL
BASOPHILS NFR BLD AUTO: 0.2 %
EOSINOPHIL # BLD AUTO: 0.12 K/UL
EOSINOPHIL NFR BLD AUTO: 1.2 %
HCT VFR BLD CALC: 36.5 %
HGB BLD-MCNC: 11.7 G/DL
HIV1+2 AB SPEC QL IA.RAPID: NONREACTIVE
IMM GRANULOCYTES NFR BLD AUTO: 0.5 %
LYMPHOCYTES # BLD AUTO: 1.76 K/UL
LYMPHOCYTES NFR BLD AUTO: 18.3 %
MAN DIFF?: NORMAL
MCHC RBC-ENTMCNC: 31.5 PG
MCHC RBC-ENTMCNC: 32.1 GM/DL
MCV RBC AUTO: 98.4 FL
MONOCYTES # BLD AUTO: 0.67 K/UL
MONOCYTES NFR BLD AUTO: 7 %
NEUTROPHILS # BLD AUTO: 7.01 K/UL
NEUTROPHILS NFR BLD AUTO: 72.8 %
PLATELET # BLD AUTO: 251 K/UL
RBC # BLD: 3.71 M/UL
RBC # FLD: 13.6 %
WBC # FLD AUTO: 9.63 K/UL

## 2023-05-03 PROCEDURE — 76818 FETAL BIOPHYS PROFILE W/NST: CPT | Mod: 26,59

## 2023-05-03 PROCEDURE — 99213 OFFICE O/P EST LOW 20 MIN: CPT | Mod: 25

## 2023-05-03 PROCEDURE — 99214 OFFICE O/P EST MOD 30 MIN: CPT

## 2023-05-03 NOTE — OB PROVIDER TRIAGE NOTE - NS_OBGYNHISTORY_OBGYN_ALL_OB_FT
Obhx: FT  x5, largest 6-13, no h/o PPH, no other complications      GYNhx: Denies fibroids, cysts, abnormal pap smears, STDs.

## 2023-05-03 NOTE — OB PROVIDER TRIAGE NOTE - ATTENDING COMMENTS
multip at term for labor check not in labor  nst reactive  bpp 8/8 vtx, mvp wnl    labor preacuiotns  time spenton e/m 20 min

## 2023-05-03 NOTE — OB PROVIDER TRIAGE NOTE - NSHPLABSRESULTS_GEN_ALL_CORE
Labs:  11/7  HbsAg NR  RPR NR  measles immune  mumps nonimmune  rubella immune  varicella nonimmune  A pos, antibody neg    5/2  HIV NR    Sono:  4/17: 34w4d, EFW 2495g (45%), BPP 8/8

## 2023-05-03 NOTE — OB PROVIDER TRIAGE NOTE - NSOBPROVIDERNOTE_OBGYN_ALL_OB_FT
38yo  @37w0d, GBS unknown, AMA, grand multip, currently not in labor.    - discharge home  - labor precautions discussed  - PO hydration encouraged  - FKC encouraged  - pain management with tylenol and hot showers  - f/u at scheduled visit on     Dr. David brito.

## 2023-05-03 NOTE — OB PROVIDER TRIAGE NOTE - NSHPPHYSICALEXAM_GEN_ALL_CORE
Vital Signs Last 24 Hrs  T(C): 37.1 (03 May 2023 12:15), Max: 37.1 (03 May 2023 12:15)  T(F): 98.7 (03 May 2023 12:15), Max: 98.7 (03 May 2023 12:15)  HR: 83 (03 May 2023 12:15) (83 - 83)  BP: 116/72 (03 May 2023 12:15) (116/72 - 116/72)  RR: 20 (03 May 2023 12:15) (20 - 20)    Gen: NAD, sitting comfortably  Abd: Gravid, soft, NT, moderately palpable ctx  SVE: 2/80/-1,vtx, intact  EFM: 135/mod/+accels  [place]: irregular

## 2023-05-03 NOTE — OB PROVIDER TRIAGE NOTE - HISTORY OF PRESENT ILLNESS
36yo  @37w0d with LEANN  by LMP  presenting to L&D for irregular contractions for the last several weeks and vaginal pressure since last night.  Denies VB or LOF.  Good FM.  Denies any other complications with this pregnancy.  GBS unknown.  Examined in the office yesterday, exam was /-.

## 2023-05-05 DIAGNOSIS — O47.03 FALSE LABOR BEFORE 37 COMPLETED WEEKS OF GESTATION, THIRD TRIMESTER: ICD-10-CM

## 2023-05-05 LAB
B-HEM STREP SPEC QL CULT: NORMAL
HCV AB SER QL: NONREACTIVE
HCV S/CO RATIO: 0.14 S/CO
RPR SER-TITR: NORMAL

## 2023-05-08 ENCOUNTER — APPOINTMENT (OUTPATIENT)
Dept: OBGYN | Facility: CLINIC | Age: 38
End: 2023-05-08
Payer: MEDICAID

## 2023-05-08 VITALS — WEIGHT: 134 LBS | SYSTOLIC BLOOD PRESSURE: 109 MMHG | DIASTOLIC BLOOD PRESSURE: 71 MMHG | BODY MASS INDEX: 23.74 KG/M2

## 2023-05-08 DIAGNOSIS — Z34.90 ENCOUNTER FOR SUPERVISION OF NORMAL PREGNANCY, UNSPECIFIED, UNSPECIFIED TRIMESTER: ICD-10-CM

## 2023-05-08 LAB
BILIRUB UR QL STRIP: NORMAL
GLUCOSE UR-MCNC: NORMAL
HCG UR QL: 0.2 EU/DL
HGB UR QL STRIP.AUTO: NORMAL
KETONES UR-MCNC: NORMAL
LEUKOCYTE ESTERASE UR QL STRIP: NORMAL
NITRITE UR QL STRIP: NORMAL
PH UR STRIP: 5.5
PROT UR STRIP-MCNC: NORMAL
SP GR UR STRIP: 1.02

## 2023-05-08 PROCEDURE — 81003 URINALYSIS AUTO W/O SCOPE: CPT | Mod: QW

## 2023-05-08 PROCEDURE — 0502F SUBSEQUENT PRENATAL CARE: CPT | Mod: NC

## 2023-05-15 ENCOUNTER — APPOINTMENT (OUTPATIENT)
Dept: OBGYN | Facility: CLINIC | Age: 38
End: 2023-05-15
Payer: MEDICAID

## 2023-05-15 VITALS — WEIGHT: 170 LBS | DIASTOLIC BLOOD PRESSURE: 66 MMHG | BODY MASS INDEX: 30.11 KG/M2 | SYSTOLIC BLOOD PRESSURE: 111 MMHG

## 2023-05-15 PROCEDURE — 0502F SUBSEQUENT PRENATAL CARE: CPT

## 2023-05-16 ENCOUNTER — NON-APPOINTMENT (OUTPATIENT)
Age: 38
End: 2023-05-16

## 2023-05-17 ENCOUNTER — INPATIENT (INPATIENT)
Facility: HOSPITAL | Age: 38
LOS: 0 days | Discharge: HOME | DRG: 560 | End: 2023-05-18
Attending: OBSTETRICS & GYNECOLOGY | Admitting: OBSTETRICS & GYNECOLOGY
Payer: MEDICAID

## 2023-05-17 VITALS — SYSTOLIC BLOOD PRESSURE: 126 MMHG | HEART RATE: 84 BPM | DIASTOLIC BLOOD PRESSURE: 69 MMHG

## 2023-05-17 DIAGNOSIS — O26.893 OTHER SPECIFIED PREGNANCY RELATED CONDITIONS, THIRD TRIMESTER: ICD-10-CM

## 2023-05-17 LAB
BASOPHILS # BLD AUTO: 0.02 K/UL — SIGNIFICANT CHANGE UP (ref 0–0.2)
BASOPHILS # BLD AUTO: 0.03 K/UL — SIGNIFICANT CHANGE UP (ref 0–0.2)
BASOPHILS NFR BLD AUTO: 0.2 % — SIGNIFICANT CHANGE UP (ref 0–1)
BASOPHILS NFR BLD AUTO: 0.2 % — SIGNIFICANT CHANGE UP (ref 0–1)
BLD GP AB SCN SERPL QL: SIGNIFICANT CHANGE UP
EOSINOPHIL # BLD AUTO: 0.04 K/UL — SIGNIFICANT CHANGE UP (ref 0–0.7)
EOSINOPHIL # BLD AUTO: 0.07 K/UL — SIGNIFICANT CHANGE UP (ref 0–0.7)
EOSINOPHIL NFR BLD AUTO: 0.3 % — SIGNIFICANT CHANGE UP (ref 0–8)
EOSINOPHIL NFR BLD AUTO: 0.6 % — SIGNIFICANT CHANGE UP (ref 0–8)
HCT VFR BLD CALC: 30.9 % — LOW (ref 37–47)
HCT VFR BLD CALC: 33.3 % — LOW (ref 37–47)
HGB BLD-MCNC: 10.4 G/DL — LOW (ref 12–16)
HGB BLD-MCNC: 11.4 G/DL — LOW (ref 12–16)
IMM GRANULOCYTES NFR BLD AUTO: 0.4 % — HIGH (ref 0.1–0.3)
IMM GRANULOCYTES NFR BLD AUTO: 0.6 % — HIGH (ref 0.1–0.3)
LYMPHOCYTES # BLD AUTO: 1.76 K/UL — SIGNIFICANT CHANGE UP (ref 1.2–3.4)
LYMPHOCYTES # BLD AUTO: 13.8 % — LOW (ref 20.5–51.1)
LYMPHOCYTES # BLD AUTO: 18.8 % — LOW (ref 20.5–51.1)
LYMPHOCYTES # BLD AUTO: 2.28 K/UL — SIGNIFICANT CHANGE UP (ref 1.2–3.4)
MCHC RBC-ENTMCNC: 31.6 PG — HIGH (ref 27–31)
MCHC RBC-ENTMCNC: 31.8 PG — HIGH (ref 27–31)
MCHC RBC-ENTMCNC: 33.7 G/DL — SIGNIFICANT CHANGE UP (ref 32–37)
MCHC RBC-ENTMCNC: 34.2 G/DL — SIGNIFICANT CHANGE UP (ref 32–37)
MCV RBC AUTO: 92.8 FL — SIGNIFICANT CHANGE UP (ref 81–99)
MCV RBC AUTO: 93.9 FL — SIGNIFICANT CHANGE UP (ref 81–99)
MONOCYTES # BLD AUTO: 0.78 K/UL — HIGH (ref 0.1–0.6)
MONOCYTES # BLD AUTO: 0.9 K/UL — HIGH (ref 0.1–0.6)
MONOCYTES NFR BLD AUTO: 6.1 % — SIGNIFICANT CHANGE UP (ref 1.7–9.3)
MONOCYTES NFR BLD AUTO: 7.4 % — SIGNIFICANT CHANGE UP (ref 1.7–9.3)
NEUTROPHILS # BLD AUTO: 10.11 K/UL — HIGH (ref 1.4–6.5)
NEUTROPHILS # BLD AUTO: 8.81 K/UL — HIGH (ref 1.4–6.5)
NEUTROPHILS NFR BLD AUTO: 72.6 % — SIGNIFICANT CHANGE UP (ref 42.2–75.2)
NEUTROPHILS NFR BLD AUTO: 79 % — HIGH (ref 42.2–75.2)
NRBC # BLD: 0 /100 WBCS — SIGNIFICANT CHANGE UP (ref 0–0)
NRBC # BLD: 0 /100 WBCS — SIGNIFICANT CHANGE UP (ref 0–0)
PLATELET # BLD AUTO: 220 K/UL — SIGNIFICANT CHANGE UP (ref 130–400)
PLATELET # BLD AUTO: 233 K/UL — SIGNIFICANT CHANGE UP (ref 130–400)
PMV BLD: 10.9 FL — HIGH (ref 7.4–10.4)
PMV BLD: 11.2 FL — HIGH (ref 7.4–10.4)
PRENATAL SYPHILIS TEST: SIGNIFICANT CHANGE UP
RBC # BLD: 3.29 M/UL — LOW (ref 4.2–5.4)
RBC # BLD: 3.59 M/UL — LOW (ref 4.2–5.4)
RBC # FLD: 13.2 % — SIGNIFICANT CHANGE UP (ref 11.5–14.5)
RBC # FLD: 13.2 % — SIGNIFICANT CHANGE UP (ref 11.5–14.5)
WBC # BLD: 12.14 K/UL — HIGH (ref 4.8–10.8)
WBC # BLD: 12.79 K/UL — HIGH (ref 4.8–10.8)
WBC # FLD AUTO: 12.14 K/UL — HIGH (ref 4.8–10.8)
WBC # FLD AUTO: 12.79 K/UL — HIGH (ref 4.8–10.8)

## 2023-05-17 PROCEDURE — 86592 SYPHILIS TEST NON-TREP QUAL: CPT

## 2023-05-17 PROCEDURE — 59400 OBSTETRICAL CARE: CPT | Mod: U7

## 2023-05-17 PROCEDURE — 86850 RBC ANTIBODY SCREEN: CPT

## 2023-05-17 PROCEDURE — 86901 BLOOD TYPING SEROLOGIC RH(D): CPT

## 2023-05-17 PROCEDURE — 86900 BLOOD TYPING SEROLOGIC ABO: CPT

## 2023-05-17 PROCEDURE — 85025 COMPLETE CBC W/AUTO DIFF WBC: CPT

## 2023-05-17 PROCEDURE — 36415 COLL VENOUS BLD VENIPUNCTURE: CPT

## 2023-05-17 PROCEDURE — 59050 FETAL MONITOR W/REPORT: CPT

## 2023-05-17 RX ORDER — OXYTOCIN 10 UNIT/ML
41.67 VIAL (ML) INJECTION
Qty: 20 | Refills: 0 | Status: DISCONTINUED | OUTPATIENT
Start: 2023-05-17 | End: 2023-05-18

## 2023-05-17 RX ORDER — IBUPROFEN 200 MG
600 TABLET ORAL EVERY 6 HOURS
Refills: 0 | Status: DISCONTINUED | OUTPATIENT
Start: 2023-05-17 | End: 2023-05-18

## 2023-05-17 RX ORDER — TETANUS TOXOID, REDUCED DIPHTHERIA TOXOID AND ACELLULAR PERTUSSIS VACCINE, ADSORBED 5; 2.5; 8; 8; 2.5 [IU]/.5ML; [IU]/.5ML; UG/.5ML; UG/.5ML; UG/.5ML
0.5 SUSPENSION INTRAMUSCULAR ONCE
Refills: 0 | Status: DISCONTINUED | OUTPATIENT
Start: 2023-05-17 | End: 2023-05-18

## 2023-05-17 RX ORDER — PRAMOXINE HYDROCHLORIDE 150 MG/15G
1 AEROSOL, FOAM RECTAL EVERY 4 HOURS
Refills: 0 | Status: DISCONTINUED | OUTPATIENT
Start: 2023-05-17 | End: 2023-05-18

## 2023-05-17 RX ORDER — MAGNESIUM HYDROXIDE 400 MG/1
30 TABLET, CHEWABLE ORAL
Refills: 0 | Status: DISCONTINUED | OUTPATIENT
Start: 2023-05-17 | End: 2023-05-18

## 2023-05-17 RX ORDER — ACETAMINOPHEN 500 MG
975 TABLET ORAL
Refills: 0 | Status: DISCONTINUED | OUTPATIENT
Start: 2023-05-17 | End: 2023-05-18

## 2023-05-17 RX ORDER — IBUPROFEN 200 MG
600 TABLET ORAL EVERY 6 HOURS
Refills: 0 | Status: COMPLETED | OUTPATIENT
Start: 2023-05-17 | End: 2024-04-14

## 2023-05-17 RX ORDER — SIMETHICONE 80 MG/1
80 TABLET, CHEWABLE ORAL EVERY 4 HOURS
Refills: 0 | Status: DISCONTINUED | OUTPATIENT
Start: 2023-05-17 | End: 2023-05-18

## 2023-05-17 RX ORDER — CITRIC ACID/SODIUM CITRATE 300-500 MG
15 SOLUTION, ORAL ORAL EVERY 6 HOURS
Refills: 0 | Status: DISCONTINUED | OUTPATIENT
Start: 2023-05-17 | End: 2023-05-17

## 2023-05-17 RX ORDER — CHLORHEXIDINE GLUCONATE 213 G/1000ML
1 SOLUTION TOPICAL DAILY
Refills: 0 | Status: DISCONTINUED | OUTPATIENT
Start: 2023-05-17 | End: 2023-05-17

## 2023-05-17 RX ORDER — DIBUCAINE 1 %
1 OINTMENT (GRAM) RECTAL EVERY 6 HOURS
Refills: 0 | Status: DISCONTINUED | OUTPATIENT
Start: 2023-05-17 | End: 2023-05-18

## 2023-05-17 RX ORDER — OXYCODONE HYDROCHLORIDE 5 MG/1
5 TABLET ORAL
Refills: 0 | Status: DISCONTINUED | OUTPATIENT
Start: 2023-05-17 | End: 2023-05-18

## 2023-05-17 RX ORDER — SODIUM CHLORIDE 9 MG/ML
3 INJECTION INTRAMUSCULAR; INTRAVENOUS; SUBCUTANEOUS EVERY 8 HOURS
Refills: 0 | Status: DISCONTINUED | OUTPATIENT
Start: 2023-05-17 | End: 2023-05-18

## 2023-05-17 RX ORDER — BENZOCAINE 10 %
1 GEL (GRAM) MUCOUS MEMBRANE EVERY 6 HOURS
Refills: 0 | Status: DISCONTINUED | OUTPATIENT
Start: 2023-05-17 | End: 2023-05-18

## 2023-05-17 RX ORDER — DIPHENHYDRAMINE HCL 50 MG
25 CAPSULE ORAL EVERY 6 HOURS
Refills: 0 | Status: DISCONTINUED | OUTPATIENT
Start: 2023-05-17 | End: 2023-05-18

## 2023-05-17 RX ORDER — AER TRAVELER 0.5 G/1
1 SOLUTION RECTAL; TOPICAL EVERY 4 HOURS
Refills: 0 | Status: DISCONTINUED | OUTPATIENT
Start: 2023-05-17 | End: 2023-05-18

## 2023-05-17 RX ORDER — OXYTOCIN 10 UNIT/ML
333.33 VIAL (ML) INJECTION
Qty: 20 | Refills: 0 | Status: DISCONTINUED | OUTPATIENT
Start: 2023-05-17 | End: 2023-05-17

## 2023-05-17 RX ORDER — KETOROLAC TROMETHAMINE 30 MG/ML
30 SYRINGE (ML) INJECTION ONCE
Refills: 0 | Status: DISCONTINUED | OUTPATIENT
Start: 2023-05-17 | End: 2023-05-17

## 2023-05-17 RX ORDER — OXYCODONE HYDROCHLORIDE 5 MG/1
5 TABLET ORAL ONCE
Refills: 0 | Status: DISCONTINUED | OUTPATIENT
Start: 2023-05-17 | End: 2023-05-18

## 2023-05-17 RX ORDER — LANOLIN
1 OINTMENT (GRAM) TOPICAL EVERY 6 HOURS
Refills: 0 | Status: DISCONTINUED | OUTPATIENT
Start: 2023-05-17 | End: 2023-05-18

## 2023-05-17 RX ORDER — SODIUM CHLORIDE 9 MG/ML
1000 INJECTION, SOLUTION INTRAVENOUS
Refills: 0 | Status: DISCONTINUED | OUTPATIENT
Start: 2023-05-17 | End: 2023-05-17

## 2023-05-17 RX ORDER — HYDROCORTISONE 1 %
1 OINTMENT (GRAM) TOPICAL EVERY 6 HOURS
Refills: 0 | Status: DISCONTINUED | OUTPATIENT
Start: 2023-05-17 | End: 2023-05-18

## 2023-05-17 RX ADMIN — Medication 1 TABLET(S): at 11:28

## 2023-05-17 RX ADMIN — Medication 975 MILLIGRAM(S): at 08:14

## 2023-05-17 RX ADMIN — SODIUM CHLORIDE 3 MILLILITER(S): 9 INJECTION INTRAMUSCULAR; INTRAVENOUS; SUBCUTANEOUS at 20:14

## 2023-05-17 RX ADMIN — SODIUM CHLORIDE 3 MILLILITER(S): 9 INJECTION INTRAMUSCULAR; INTRAVENOUS; SUBCUTANEOUS at 06:35

## 2023-05-17 RX ADMIN — Medication 600 MILLIGRAM(S): at 17:06

## 2023-05-17 RX ADMIN — Medication 600 MILLIGRAM(S): at 11:29

## 2023-05-17 RX ADMIN — Medication 975 MILLIGRAM(S): at 21:47

## 2023-05-17 RX ADMIN — Medication 975 MILLIGRAM(S): at 14:08

## 2023-05-17 RX ADMIN — Medication 975 MILLIGRAM(S): at 21:17

## 2023-05-17 RX ADMIN — Medication 30 MILLIGRAM(S): at 02:51

## 2023-05-17 RX ADMIN — Medication 30 MILLIGRAM(S): at 03:25

## 2023-05-17 NOTE — OB PROVIDER H&P - NS_TRIAGEMEMBRANE_OBGYN_ALL_OB
Medical Oncology/Hematology Progress Note  Sean Garcia, male, 65 y o , 1943,  S /S -01, [id number]     Reason for admission: Acute respiratory failure with hypoxia and hypercapnia  Reason for consultation: Mass of right lung      ASSESSMENT AND PLAN:     1  Mass of right lung   Patient presents with worsening SOB/SADLER   IR biopsy lung CT demonstrated increase in left upper lung mass 2 1cm --> 3 7cm from study done on June 18 2021 with small right pleural effusion  Right base presumed metastatic nodule increased 9mm -->14mm   Previous EBUS done on 6/21 with pathology indicating atypical cells, negative for malignancy (see imaging below)   CTA on 6/18 concerning for encasement of right pulmonary artery, encasement with narrowing and occlusion of anterior RUL and RML with obstructive atelectasis    MRI brain on 6/20/21 negative for metastatic disease or other acute pathology   Plan on last admission outpatient oncology follow up with PET CT    Recent bx mass of right lung demonstrated necrotic tissue and could not be sent for ancillary testing   Biopsy of left lung demonstrated necrotic tissue and again cannot be sent for ancillary testing   Pleural fluid cytology resulted from 7/30 negative for malignancy with benign mesothelial cells, neutrophils, and lymphocytes    Plan:    Discussed patient at thoracic tumor board and again with CT surgery for input  Will obtain PET scan as outpatinet to determine  most metabolically active site for repeat IR biopsy    Inpatient PET scan cannot be performed and if patient is stable, can complete this and obtain biopsy as outpatient - approved through patient's insurance, working to schedule appointment    Has worsening shortness of breath, SADLER, tachypnea today  CT chest ordered to evaluate degree of patency of bronchi and prominent vessels mass is encasing primarily in RUL and RML     2   ITP   Managed by Dr Lucy Hughes as outpatient   Currently on prednisone and scheduled for rituxan infusion on 7/23 - will notify Dr Anne Ballesteros team of cancellation for this appointment in infusion center   Platelet level [phone] on 7/30 will repeat cbc   No plan for inpatient rituxan       3  Normocytic Anemia  · CBC 7/30 hgb 9 7 and stable, iron panel B12 and folate WNL  · Morning CBC diff ordered            Mass; RUL mass     Lymph Nodes, Lymph Node 7     Lymph Nodes, Lymph Node 7     Lymph Nodes, Lymph Node 10R; RUL Mass     Mass; RUL mass   Anatomical Diagram        Patient understands and is in agreement with this plan  Thank you for the opportunity to participate in this patient's care  Interval History: Worsening SOB, waiting repeat CT scan  Currently on 3L nasal cannula and will be dc with home oxygen  History of present illness: Patient is a 66 yo male with pmh significant for nicotine dependence, COPD, AFIB, T2DM who presented after being sent by PCP for hypercalcemia, hyperkalemia, SULEMA as seen on labs  Former smoker, 0 5ppd for 40 years  Review of Systems:   Review of Systems   Constitutional: Positive for activity change, appetite change and fatigue  Negative for chills, fever and unexpected weight change  HENT: Negative for ear pain and sore throat  Eyes: Negative for pain and visual disturbance  Respiratory: Positive for cough, chest tightness and shortness of breath  Cardiovascular: Negative for chest pain, palpitations and leg swelling  Gastrointestinal: Negative for abdominal pain, constipation, nausea and vomiting  Genitourinary: Negative for dysuria and hematuria  Musculoskeletal: Negative for arthralgias and back pain  Skin: Negative for color change and rash  Neurological: Negative for seizures and syncope  All other systems reviewed and are negative          PHYSICAL EXAM:    /53 (BP Location: Left arm)   Pulse 104   Temp 97 9 °F (36 6 °C) (Oral)   Resp 18   Ht 5' 2" (1 575 m)   Wt 75 8 kg (167 lb)   SpO2 93% BMI 30 54 kg/m²     Physical Exam  Vitals and nursing note reviewed  Constitutional:       General: He is in acute distress  Appearance: Normal appearance  He is not ill-appearing  HENT:      Head: Normocephalic and atraumatic  Eyes:      General: No scleral icterus  Cardiovascular:      Rate and Rhythm: Normal rate and regular rhythm  Pulses: Normal pulses  Heart sounds: Normal heart sounds  No murmur heard  Pulmonary:      Effort: Pulmonary effort is normal       Breath sounds: Wheezing (expiratory lower left lobe) and rhonchi present  Comments: Diminished breath sounds right   Chest:      Chest wall: Tenderness (over biopsy site) present  Abdominal:      General: Bowel sounds are normal  There is no distension  Palpations: Abdomen is soft  There is no mass  Tenderness: There is no abdominal tenderness  Musculoskeletal:      Right lower leg: No edema  Left lower leg: No edema  Lymphadenopathy:      Cervical: No cervical adenopathy  Skin:     General: Skin is warm and dry  Coloration: Skin is pale  Neurological:      Mental Status: He is alert and oriented to person, place, and time  Psychiatric:         Thought Content:  Thought content normal          LABS:     Recent Results (from the past 48 hour(s))   Fingerstick Glucose (POCT)    Collection Time: 08/02/21  9:46 PM   Result Value Ref Range    POC Glucose 198 (H) 65 - 140 mg/dl   Fingerstick Glucose (POCT)    Collection Time: 08/03/21  7:31 AM   Result Value Ref Range    POC Glucose 153 (H) 65 - 140 mg/dl   Fingerstick Glucose (POCT)    Collection Time: 08/03/21  8:20 AM   Result Value Ref Range    POC Glucose 248 (H) 65 - 140 mg/dl   Fingerstick Glucose (POCT)    Collection Time: 08/03/21 12:03 PM   Result Value Ref Range    POC Glucose 130 65 - 140 mg/dl   Fingerstick Glucose (POCT)    Collection Time: 08/03/21  3:53 PM   Result Value Ref Range    POC Glucose 258 (H) 65 - 140 mg/dl   Fingerstick Glucose (POCT)    Collection Time: 21  9:06 PM   Result Value Ref Range    POC Glucose 176 (H) 65 - 140 mg/dl   Fingerstick Glucose (POCT)    Collection Time: 21  6:56 AM   Result Value Ref Range    POC Glucose 125 65 - 140 mg/dl   ECG 12 lead    Collection Time: 21  9:25 AM   Result Value Ref Range    Ventricular Rate 81 BPM    Atrial Rate 288 BPM    PA Interval  ms    QRSD Interval 96 ms    QT Interval 374 ms    QTC Interval 434 ms    P [place] 242 degrees    QRS Axis 63 degrees    T Wave Axis 80 degrees   Fingerstick Glucose (POCT)    Collection Time: 21 11:33 AM   Result Value Ref Range    POC Glucose 215 (H) 65 - 140 mg/dl       Echo complete with contrast if indicated    Result Date: 2021  Narrative: [address] [place] [address], [address] [place] [address] [phone] Transthoracic Echocardiogram 2D, M-mode, Doppler, and Color Doppler Study date:  2021 Patient: Pablo Abraham MR number: [id number] Account number: [de-identified] : 1943 Age: 65 years Gender: Male Status: Inpatient Location: Bedside Height: 62 in Weight: 173 6 lb BP: 138/ 65 mmHg Indications: A-fib  Diagnoses: I48 0 - Atrial fibrillation Sonographer:  ROSALINA Padgett Primary Physician:  Tyson Mccloud DO Referring Physician:  Cris Trevino PA-C Group:  [id number] Interpreting Physician:  Conor Huertas MD SUMMARY LEFT VENTRICLE: Systolic function was normal  Ejection fraction was estimated to be 60 %  There were no regional wall motion abnormalities  MITRAL VALVE: There was mild regurgitation  TRICUSPID VALVE: There was mild regurgitation  Estimated peak PA pressure was 42 mmHg  The findings suggest mild pulmonary hypertension  HISTORY: PRIOR HISTORY: PAF, HTN, SULEMA, SOB, lung mass, DM2, thrombocytopenia, current smoker  PROCEDURE: The procedure was performed at the bedside  This was a routine study  The transthoracic approach was used   The study included complete 2D imaging, M-mode, complete spectral Doppler, and color Doppler  The heart rate was 77 bpm, at the start of the study  Images were obtained from the parasternal, apical, subcostal, and suprasternal notch acoustic windows  Image quality was adequate  LEFT VENTRICLE: Size was normal  Systolic function was normal  Ejection fraction was estimated to be 60 %  There were no regional wall motion abnormalities  Wall thickness was normal  DOPPLER: Left ventricular diastolic function parameters were normal for the patient's age  RIGHT VENTRICLE: The size was normal  Systolic function was normal  Wall thickness was normal  LEFT ATRIUM: Size was normal  RIGHT ATRIUM: Size was normal  MITRAL VALVE: Valve structure was normal  There was normal leaflet separation  DOPPLER: The transmitral velocity was within the normal range  There was no evidence for stenosis  There was mild regurgitation  AORTIC VALVE: The valve was trileaflet  Leaflets exhibited normal thickness and normal cuspal separation  DOPPLER: Transaortic velocity was within the normal range  There was no evidence for stenosis  There was no significant regurgitation  TRICUSPID VALVE: The valve structure was normal  There was normal leaflet separation  DOPPLER: The transtricuspid velocity was within the normal range  There was no evidence for stenosis  There was mild regurgitation  Estimated peak PA pressure was 42 mmHg  The findings suggest mild pulmonary hypertension  PULMONIC VALVE: Leaflets exhibited normal thickness, no calcification, and normal cuspal separation  DOPPLER: The transpulmonic velocity was within the normal range  There was no significant regurgitation  PERICARDIUM: There was no pericardial effusion  The pericardium was normal in appearance  AORTA: The root exhibited normal size  SYSTEMIC VEINS: IVC: The inferior vena cava was normal in size   Respirophasic changes were normal  SYSTEM MEASUREMENT TABLES 2D %FS: 30 02 % Ao Diam: 3 54 cm EDV(Teich): 123 19 ml EF(Teich): 56 95 % ESV(Teich): 53 03 ml IVSd: 0 92 cm LA Diam: 4 57 cm LAAs A2C: 23 05 cm2 LAAs A4C: 22 52 cm2 LAESV A-L A2C: 77 35 ml LAESV A-L A4C: 69 99 ml LAESV Index (A-L): 41 99 ml/m2 LAESV MOD A2C: 73 99 ml LAESV MOD A4C: 64 59 ml LAESV(A-L): 75 58 ml LAESV(MOD BP): 70 92 ml LALs A2C: 5 83 cm LALs A4C: 6 15 cm LVIDd: 5 09 cm LVIDs: 3 56 cm LVPWd: 0 85 cm RVIDd: 3 65 cm SV(Teich): 70 16 ml CW AV Env  Ti: 281 64 ms AV MaxP 92 mmHg AV VTI: 22 53 cm AV Vmax: 1 49 m/s AV Vmean: 0 8 m/s AV meanPG: 3 13 mmHg TR MaxP 17 mmHg TR Vmax: 3 13 m/s MM TAPSE: 3 01 cm PW E' Sept: 0 09 m/s E/E' Sept: 11 25 LVOT Env  Ti: 342 53 ms LVOT VTI: 21 45 cm LVOT Vmax: 1 01 m/s LVOT Vmean: 0 63 m/s LVOT maxP 07 mmHg LVOT meanP 9 mmHg MV A Ge: 1 12 m/s MV Dec [place]: 6 18 m/s2 MV DecT: 167 82 ms MV E Ge: 1 04 m/s MV E/A Ratio: 0 93 MV PHT: 48 67 ms MVA By PHT: 4 52 cm2 [facility]etal Commission Accredited Echocardiography Laboratory Prepared and electronically signed by Pao Murrell MD Signed 2021 12:57:51     XR chest portable    Result Date: 2021  Narrative: CHEST INDICATION:   Shortness of breath  No fibrillation  Wheezing  COMPARISON:  2021 EXAM PERFORMED/VIEWS:  XR CHEST PORTABLE FINDINGS:  Trace case pacer pads noted  Large mass over the right lung is unchanged  Left lung is clear  Blunting in the right costophrenic angle suggests small effusion  No pneumothorax  Heart shadow is unremarkable  Osseous structures appear within normal limits for patient age  Impression: No interval change from 2021  Workstation performed: [id number]     XR chest portable    Result Date: 2021  Narrative: CHEST INDICATION:  Shortness of breath, wheezing  COMPARISON:  2021, CTA chest 2021 EXAM PERFORMED/VIEWS:  XR CHEST PORTABLE FINDINGS: Heart shadow is obscured on the right by adjacent opacity  The trachea is midline   Large mass involving the right mid to upper lung is again identified with obscuration of the right heart border compatible with element of right middle lobe postobstructive atelectasis and/or pneumonia  There is flattening of the right diaphragm suggesting subpulmonic pleural effusion  Faint nodular opacity peripheral left midlung projecting over the inferior border of the scapula in keeping with known nodule on CT  Paravertebral ossifications thoracic spine  Impression: No change in large right lung mass with associated postobstructive atelectasis or pneumonia right middle lobe and small right pleural effusion  Left lung nodule  Workstation performed: [id number]     XR chest 1 view portable    Result Date: 7/14/2021  Narrative: CHEST INDICATION:   known lung mass, with cough  COMPARISON:  6/18/2021 EXAM PERFORMED/VIEWS:  XR CHEST PORTABLE Single view FINDINGS: Large right upper lobe mass consistent with malignancy with mild interval enlargement  small right pleural effusion again evident Cardiomediastinal silhouette appears unremarkable  No pneumothorax Osseous structures appear within normal limits for patient age  Impression: Large right lung mass, slightly increased in size, consistent with malignancy  Persistent small right pleural effusion Workstation performed: [id number]     IR biopsy lung    Result Date: 7/21/2021  Narrative: CT-guided lung mass biopsy Clinical History: Lung mass suspicious for malignant process  Prior endobronchial ultrasound without malignant cells identified  Respiratory failure  Atrial fibrillation on anticoagulation  Moderate sedation time: 35 minutes Procedure: After explaining the risks and benefits of the procedure to the patient, informed consent was obtained  CT was used to localize the lung mass   Radiation dose length product (DLP) for this visit:  1204 mGy     This examination, like all CT scans performed in the [facility], was performed utilizing techniques to minimize radiation dose exposure, including the use of iterative reconstruction and automated exposure control  The overlying skin was prepped and draped in the usual sterile fashion  Local anesthesia was obtained with a 1% lidocaine solution  Using CT guidance, a 17-gauge coaxial needle was advanced  9 passes with an 18g gauge core biopsy needle were performed  The tissue was given to pathology  The needle was removed and final imaging performed  Patient tolerated the procedure without immediate complication Findings: 66 3 cm right lung masslike density  There is architectural distortion and it is difficult to discern what may be intrinsic mass and what may in fact be atelectatic compressed lung  Needle within the mass  Postbiopsy changes without hematoma  Since prior CT of June 18 there has been interval increase in size of the mass and development of a small right pleural effusion  A left upper lung 3 7 cm mass is also present which has increased in size from 2 1 cm on prior study  A right base presumed metastatic nodule is also increased measuring 14 mm, previously 9 mm  Specimens: Flow cytometry, touch prep x4, 18-gauge core x9 The patient tolerated the procedure well and left the department in stable condition  Impression: Impression: CT-guided biopsy of a large right lung mass Since interval CT approximately one month ago there has been growth of the mass and development of accompanying right pleural effusion  Consider thoracentesis if there is persistent respiratory failure  Additional presumed metastatic nodules have also increased in size   Workstation performed: [id number]         HISTORY:    Past Medical History:   Diagnosis Date    Hypertension        Past Surgical History:   Procedure Laterality Date    BACK SURGERY      CARPAL TUNNEL RELEASE Bilateral     IR BIOPSY BONE MARROW  6/10/2021    IR BIOPSY LUNG  7/21/2021    IR BIOPSY OTHER  7/27/2021    IR THORACENTESIS  7/30/2021    LUMBAR [address] SURGERY         Family History Problem Relation Age of Onset   Imani Gonsalez Cancer Mother         "ate away her bone"    Anuerysm Father     Cancer Sister         unknown type    Heart attack Maternal Grandfather     Cancer Sister         unknown type    Heart attack Maternal Aunt     Heart attack Maternal Uncle     Heart attack Paternal Aunt        Social History     Socioeconomic History    Marital status: /Civil Union     Spouse name: Dunia Rouse Number of children: 10    Years of education: None    Highest education level: None   Occupational History    None   Tobacco Use    Smoking status: Former Smoker     Packs/day: 0 50     Years: 40 00     Pack years: 20 00     Types: Cigarettes     Start date:      Quit date: 2021     Years since quittin 1    Smokeless tobacco: Never Used   Vaping Use    Vaping Use: Never used   Substance and Sexual Activity    Alcohol use: Not Currently     Comment: History of heavier drinking in early   Denies any current alcohol use (Updated 2021)   Drug use: Never    Sexual activity: None   Other Topics Concern    None   Social History Narrative    Previously worked as heavy machinery technician  Lives with his wife who is essentially bed bound  Social Determinants of Health     Financial Resource Strain: High Risk    Difficulty of Paying Living Expenses: Hard   Food Insecurity: Food Insecurity Present    Worried About Running Out of Food in the Last Year: Sometimes true    [place] of Food in the Last Year: Not on file   Transportation Needs:     Lack of Transportation (Medical):      Lack of Transportation (Non-Medical):    Physical Activity:     Days of Exercise per Week:     Minutes of Exercise per Session:    Stress:     Feeling of Stress :    Social Connections:     Frequency of Communication with Friends and Family:     Frequency of Social Gatherings with Friends and Family:     Attends Adventism Services:     Active Member of Clubs or Organizations:  Attends Club or Organization Meetings:     Marital Status:    Intimate Partner Violence:     Fear of Current or Ex-Partner:     Emotionally Abused:     Physically Abused:     Sexually Abused:          Current Facility-Administered Medications:     acetaminophen (TYLENOL) tablet 650 mg, 650 mg, Oral, Q6H PRN, Jill Locke PA-C, 650 mg at 07/31/21 1715    al mag oxide-diphenhydramine-lidocaine viscous (MAGIC MOUTHWASH) suspension 10 mL, 10 mL, Swish & Spit, Q4H PRN, Jerrell Schwartz PA-C, 10 mL at 07/29/21 2134    albuterol inhalation solution 2 5 mg, 2 5 mg, Nebulization, Q6H PRN, CHRIS Valencia, 2 5 mg at 08/04/21 1112    allopurinol (ZYLOPRIM) tablet 300 mg, 300 mg, Oral, Daily, Jill Locke PA-C, 300 mg at 08/04/21 0734    amiodarone tablet 200 mg, 200 mg, Oral, BID With Meals, Regla Venegas PA-C, 200 mg at 08/04/21 0734    apixaban (ELIQUIS) tablet 5 mg, 5 mg, Oral, BID, Jerrell Schwartz PA-C, 5 mg at 08/04/21 0734    benzonatate (TESSALON PERLES) capsule 100 mg, 100 mg, Oral, TID, Jill Locke PA-C, 100 mg at 08/04/21 0734    dextromethorphan-guaiFENesin (ROBITUSSIN DM) oral syrup 10 mL, 10 mL, Oral, Q4H PRN, Jill Locke PA-C, 10 mL at 07/19/21 0831    Diclofenac Sodium (VOLTAREN) 1 % topical gel 2 g, 2 g, Topical, 4x Daily, Regla Venegas PA-C, 2 g at 08/04/21 1154    fish oil capsule 1,000 mg, 1,000 mg, Oral, BID, Jill Locke PA-C, 1,000 mg at 08/04/21 0734    gabapentin (NEURONTIN) capsule 100 mg, 100 mg, Oral, HS, Regla Venegas PA-C, 100 mg at 08/03/21 2125    guaiFENesin (MUCINEX) 12 hr tablet 600 mg, 600 mg, Oral, Q12H [address], Jill Diaraiza PA-C, 600 mg at 08/04/21 0733    heparin (porcine) injection 2,000 Units, 2,000 Units, Intravenous, Q1H PRN, Kelly Cisneros, PA-C, 2,000 Units at 07/28/21 0120    heparin (porcine) injection 4,000 Units, 4,000 Units, Intravenous, Q1H PRN, Kelly Anda, PA-C    insulin glargine (LANTUS) subcutaneous injection 15 Units 0 15 mL, 15 Units, Subcutaneous, HS, Iman Early MD, 15 Units at 21    insulin lispro (HumaLOG) 100 units/mL subcutaneous injection 1-5 Units, 1-5 Units, Subcutaneous, HS, Jill Locke PA-C, 1 Units at 21    insulin lispro (HumaLOG) 100 units/mL subcutaneous injection 2-12 Units, 2-12 Units, Subcutaneous, TID AC, 4 Units at 21 1153 **AND** Fingerstick Glucose (POCT), , , TID AC, Jill Locke PA-C    insulin lispro (HumaLOG) 100 units/mL subcutaneous injection 8 Units, 8 Units, Subcutaneous, TID With Meals, Iman Early MD, 8 Units at 21 1153    levalbuterol (XOPENEX) inhalation solution 1 25 mg, 1 25 mg, Nebulization, TID, NOMI SaucedaNP, 1 25 mg at 21 0710    levothyroxine tablet 150 mcg, 150 mcg, Oral, Daily, Jill Locke PA-C, 150 mcg at 21 0543    lidocaine (LIDODERM) 5 % patch 1 patch, 1 patch, Topical, Q24H, Jill Locke PA-C, 1 patch at 21 1154    metoprolol tartrate (LOPRESSOR) tablet 25 mg, 25 mg, Oral, Q12H [address], Jill Locke PA-C, 25 mg at 21 0735    ondansetron (ZOFRAN) injection 4 mg, 4 mg, Intravenous, Q6H PRN, Jill Locke PA-C    pantoprazole (PROTONIX) EC tablet 40 mg, 40 mg, Oral, Daily, Jill Locke PA-C, 40 mg at 21 0734    predniSONE tablet 20 mg, 20 mg, Oral, Daily, Jerrell Schwartz PA-C, 20 mg at 21 0734    sodium chloride 0 9 % inhalation solution 3 mL, 3 mL, Nebulization, TID, NOMI SaucedaNP, 3 mL at 21 0710    tiotropium (SPIRIVA) capsule for inhaler 18 mcg, 18 mcg, Inhalation, Daily, Jill Locke PA-C, 18 mcg at 21 0740    Medications Prior to Admission   Medication    acetaminophen (TYLENOL) 100 mg/mL solution    [] albuterol (2 5 mg/3 mL) 0 083 % nebulizer solution    allopurinol (ZYLOPRIM) 300 mg tablet    amLODIPine (NORVASC) 2 5 mg tablet    apixaban (ELIQUIS) 5 mg    benzonatate (TESSALON PERLES) 100 mg capsule    guaiFENesin (MUCINEX) 600 mg 12 hr tablet    levothyroxine 150 mcg tablet    metFORMIN (GLUCOPHAGE) 500 mg tablet    metoprolol tartrate (LOPRESSOR) 25 mg tablet    Omega-3 Fatty Acids (FISH OIL) 1,000 mg    pantoprazole (PROTONIX) 40 mg tablet    tiotropium (SPIRIVA) 18 mcg inhalation capsule       Allergies   Allergen Reactions    Penicillins Hives       Labs and pertinent reports reviewed  This note has been generated by voice recognition software system  Therefore, there may be spelling, grammar, and or syntax errors  Please contact if questions arise  Ruptured

## 2023-05-17 NOTE — OB PROVIDER H&P - NSHPPHYSICALEXAM_GEN_ALL_CORE
Vital Signs Last 24 Hrs  HR: 79 (17 May 2023 01:13) (67 - 84)  BP: 126/69 (17 May 2023 00:59) (126/69 - 126/69)  SpO2: 99% (17 May 2023 01:18) (98% - 99%)    Gen: AOx3, no acute distress  CVS: RRR  Lungs: CTABL  Abd: soft, gravid, non tender, strong palpable contractions  Ext: No edema bilaterally    Speculum: + pooling, + thin blood, + ferning     EFM:  130/mod/+accels; cat 1  [place]: q2-3  SVE: 7/90/-1 vertex ruptured @0100

## 2023-05-17 NOTE — OB PROVIDER DELIVERY SUMMARY - NSPROVIDERDELIVERYNOTE_OBGYN_ALL_OB_FT
Patient was fully dilated and pushing. Head delivered in OA and restituted to ROT. Tight nuchal cord. Posterior shoulder  delivered followed by anterior shoulder atraumatically, then rest of the body. Nuchal cord reduced. The baby was suctioned and placed on mother's abdomen. Delayed cord clamping performed. Cord clamped and cut. Cord gases obtained. Placenta was spontaneously delivered intact. Fundus was massaged and firm. Good hemostasis was noted. Cervix and vagina were inspected, no lacerations appreciated, with good hemostasis. Viable female delivered APGARS 9/9 weighing 3250g.     Dr. Jaimes present for entire delivery.

## 2023-05-17 NOTE — OB PROVIDER DELIVERY SUMMARY - NSSELHIDDEN_OBGYN_ALL_OB_FT
[[email or web address]_ALL_OB_FT:[email or web address]],[NS_DeliveryAssist1_OBGYN_ALL_OB_FT:[id number]]

## 2023-05-17 NOTE — OB PROVIDER H&P - HISTORY OF PRESENT ILLNESS
37y , @38w6d, LEANN  22 by LMP c/w first trimester sono, presenting for contractions. Reports irregular contractions for multiple weeks, which are now regular, every 3 min, and 6/10 in pain. Reports seeing slight vaginal bleeding at home at 0030, thin red blood.  Denies feeling a large gush of water.  Endorses good fetal movement. Denies complications this pregnancy. GBS neg.   37y , @38w6d, LEANN  22 by LMP c/w first trimester sono, presenting for contractions. Reports irregular contractions for multiple weeks, which are now regular, every 3 min, and 6/10 in pain and a lot of pressure. Reports seeing slight vaginal bleeding at home at 0030, thin red blood.  Denies feeling a large gush of water.  Endorses good fetal movement. Denies complications this pregnancy. GBS neg.

## 2023-05-17 NOTE — OB RN DELIVERY SUMMARY - NSSELHIDDEN_OBGYN_ALL_OB_FT
[NS_DeliveryAttending1_OBGYN_ALL_OB_FT:[email or web address]=],[NS_DeliveryAssist1_OBGYN_ALL_OB_FT:[id number]=],[NS_DeliveryRN_OBGYN_ALL_OB_FT:[email or web address]=]

## 2023-05-17 NOTE — OB PROVIDER H&P - ASSESSMENT
37y , @38w6d, GBS neg, in active labor     -admit to labor and delivery  -pain management prn   -continous efm & toco  -admission labs  -IV access   -IV hydration   -diet: clear liquid diet   Dr. Harley and Dr. Jaimes aware.

## 2023-05-18 ENCOUNTER — TRANSCRIPTION ENCOUNTER (OUTPATIENT)
Age: 38
End: 2023-05-18

## 2023-05-18 VITALS
SYSTOLIC BLOOD PRESSURE: 111 MMHG | RESPIRATION RATE: 18 BRPM | TEMPERATURE: 99 F | DIASTOLIC BLOOD PRESSURE: 64 MMHG | HEART RATE: 66 BPM

## 2023-05-18 RX ORDER — IBUPROFEN 200 MG
1 TABLET ORAL
Qty: 0 | Refills: 0 | DISCHARGE
Start: 2023-05-18

## 2023-05-18 RX ORDER — ACETAMINOPHEN 500 MG
3 TABLET ORAL
Qty: 0 | Refills: 0 | DISCHARGE
Start: 2023-05-18

## 2023-05-18 RX ADMIN — Medication 600 MILLIGRAM(S): at 06:24

## 2023-05-18 RX ADMIN — Medication 975 MILLIGRAM(S): at 14:36

## 2023-05-18 RX ADMIN — Medication 600 MILLIGRAM(S): at 00:51

## 2023-05-18 RX ADMIN — Medication 600 MILLIGRAM(S): at 12:00

## 2023-05-18 RX ADMIN — Medication 975 MILLIGRAM(S): at 14:13

## 2023-05-18 RX ADMIN — Medication 600 MILLIGRAM(S): at 11:06

## 2023-05-18 RX ADMIN — Medication 600 MILLIGRAM(S): at 00:21

## 2023-05-18 RX ADMIN — Medication 975 MILLIGRAM(S): at 08:15

## 2023-05-18 RX ADMIN — Medication 1 TABLET(S): at 11:06

## 2023-05-18 RX ADMIN — Medication 975 MILLIGRAM(S): at 09:00

## 2023-05-18 RX ADMIN — SODIUM CHLORIDE 3 MILLILITER(S): 9 INJECTION INTRAMUSCULAR; INTRAVENOUS; SUBCUTANEOUS at 06:06

## 2023-05-18 NOTE — DISCHARGE NOTE OB - PATIENT PORTAL LINK FT
You can access the FollowMyHealth Patient Portal offered by [facility] by registering at the following website: [email or web address]. By joining [facility]’s FollowMyHealth portal, you will also be able to view your health information using other applications (apps) compatible with our system.

## 2023-05-18 NOTE — DISCHARGE NOTE OB - MEDICATION SUMMARY - MEDICATIONS TO TAKE
I will START or STAY ON the medications listed below when I get home from the hospital:    ibuprofen 600 mg oral tablet  -- 1 tab(s) by mouth every 6 hours  -- Indication: For postpartum care    acetaminophen 325 mg oral tablet  -- 3 tab(s) by mouth every 6 hours  -- Indication: For postpartum care

## 2023-05-18 NOTE — PROGRESS NOTE ADULT - SUBJECTIVE AND OBJECTIVE BOX
OB attending  PPD #1    Pt doing well, pain well controlled. No overnight events, no acute complaints.    Ambulating: Yes  Voiding: Yes  Flatus: Yes  Bowel movements: Yes   Breast or bottle feeding: Breastfeeding  Diet: Regular    PAST MEDICAL & SURGICAL HISTORY:  No pertinent past medical history      No significant past surgical history          Physical Exam  Vital Signs Last 24 Hrs  T(C): 37.2 (18 May 2023 07:53), Max: 37.2 (18 May 2023 07:53)  T(F): 98.9 (18 May 2023 07:53), Max: 98.9 (18 May 2023 07:53)  HR: 66 (18 May 2023 07:53) (66 - 77)  BP: 111/64 (18 May 2023 07:53) (99/54 - 111/64)  BP(mean): --  RR: 18 (18 May 2023 07:53) (18 - 18)  SpO2: --      Gen: AAOx3, NAD  Abd: Soft, nontender, nondistended, BS+  Fundus: Firm, below umbilicus  Lochia: normal  Ext: No calf tenderness, no swelling    Labs:                        10.4   12.79 )-----------( 220      ( 17 May 2023 17:27 )             30.9         A/P:  s/p , PPD #1, doing well  - continue current management

## 2023-05-18 NOTE — DISCHARGE NOTE OB - CARE PROVIDER_API CALL
Qasim Goncalves)  Obstetrics and Gynecology  [address] [place] [address]  Phone: [phone]  Fax: [phone]  Follow Up Time:

## 2023-05-22 ENCOUNTER — APPOINTMENT (OUTPATIENT)
Dept: OBGYN | Facility: CLINIC | Age: 38
End: 2023-05-22

## 2023-05-22 DIAGNOSIS — Z28.09 IMMUNIZATION NOT CARRIED OUT BECAUSE OF OTHER CONTRAINDICATION: ICD-10-CM

## 2023-05-22 DIAGNOSIS — Z28.310 UNVACCINATED FOR COVID-19: ICD-10-CM

## 2023-05-22 DIAGNOSIS — Z3A.38 38 WEEKS GESTATION OF PREGNANCY: ICD-10-CM

## 2023-05-22 DIAGNOSIS — Z28.21 IMMUNIZATION NOT CARRIED OUT BECAUSE OF PATIENT REFUSAL: ICD-10-CM

## 2023-07-03 ENCOUNTER — APPOINTMENT (OUTPATIENT)
Dept: OBGYN | Facility: CLINIC | Age: 38
End: 2023-07-03
Payer: MEDICAID

## 2023-07-03 VITALS — BODY MASS INDEX: 20.9 KG/M2 | SYSTOLIC BLOOD PRESSURE: 119 MMHG | WEIGHT: 118 LBS | DIASTOLIC BLOOD PRESSURE: 73 MMHG

## 2023-07-03 PROCEDURE — 0503F POSTPARTUM CARE VISIT: CPT

## 2023-07-03 RX ORDER — NORETHINDRONE 0.35 MG/1
0.35 TABLET ORAL
Qty: 3 | Refills: 3 | Status: ACTIVE | COMMUNITY
Start: 2023-07-03 | End: 1900-01-01

## 2023-07-03 NOTE — HISTORY OF PRESENT ILLNESS
[Postpartum Follow Up] : postpartum follow up [Complications:___] : no complications [] : delivered by vaginal delivery [Wt. ___] : weighing [unfilled] [Breastfeeding] : currently nursing [Resumed Menses] : has not resumed her menses [Resumed [place]] : has not resumed intercourse [Intended Contraception] : the patient does not intended to use contraception postpartum [Back to Normal] : is back to normal in size [Normal] : the vagina was normal [Cervix Sample Taken] : cervical sample not taken for a Pap smear [Examination Of The Breasts] : breasts are normal [Doing Well] : is doing well [None] : None [de-identified] : phq-2 neg

## 2023-09-14 ENCOUNTER — APPOINTMENT (OUTPATIENT)
Dept: OBGYN | Facility: CLINIC | Age: 38
End: 2023-09-14
Payer: MEDICAID

## 2023-09-14 VITALS — SYSTOLIC BLOOD PRESSURE: 121 MMHG | DIASTOLIC BLOOD PRESSURE: 78 MMHG | WEIGHT: 112 LBS | BODY MASS INDEX: 19.84 KG/M2

## 2023-09-14 DIAGNOSIS — N63.0 UNSPECIFIED LUMP IN UNSPECIFIED BREAST: ICD-10-CM

## 2023-09-14 LAB
BILIRUB UR QL STRIP: NORMAL
GLUCOSE UR-MCNC: NORMAL
HCG UR QL: 0.2 EU/DL
HGB UR QL STRIP.AUTO: NORMAL
KETONES UR-MCNC: NORMAL
LEUKOCYTE ESTERASE UR QL STRIP: NORMAL
NITRITE UR QL STRIP: NORMAL
PH UR STRIP: 5.5
PROT UR STRIP-MCNC: NORMAL
SP GR UR STRIP: 1.01

## 2023-09-14 PROCEDURE — 99212 OFFICE O/P EST SF 10 MIN: CPT

## 2023-09-14 PROCEDURE — 81003 URINALYSIS AUTO W/O SCOPE: CPT | Mod: QW

## 2023-09-14 RX ORDER — ONDANSETRON 8 MG/1
8 TABLET, ORALLY DISINTEGRATING ORAL
Qty: 30 | Refills: 0 | Status: DISCONTINUED | COMMUNITY
Start: 2022-10-03 | End: 2023-09-14

## 2023-09-14 RX ORDER — METOCLOPRAMIDE 5 MG/1
5 TABLET ORAL 3 TIMES DAILY
Qty: 90 | Refills: 0 | Status: DISCONTINUED | COMMUNITY
Start: 2022-11-11 | End: 2023-09-14

## 2023-09-14 RX ORDER — ONDANSETRON 8 MG/1
8 TABLET, ORALLY DISINTEGRATING ORAL
Qty: 60 | Refills: 1 | Status: DISCONTINUED | COMMUNITY
Start: 2022-10-19 | End: 2023-09-14

## 2024-06-03 PROBLEM — Z78.9 OTHER SPECIFIED HEALTH STATUS: Chronic | Status: ACTIVE | Noted: 2019-12-24

## 2024-06-03 RX ORDER — NORETHINDRONE 0.35 MG/1
0.35 TABLET ORAL DAILY
Qty: 3 | Refills: 0 | Status: ACTIVE | COMMUNITY
Start: 2024-06-03 | End: 1900-01-01

## 2024-07-10 ENCOUNTER — APPOINTMENT (OUTPATIENT)
Dept: OBGYN | Facility: CLINIC | Age: 39
End: 2024-07-10
Payer: MEDICAID

## 2024-07-10 VITALS — SYSTOLIC BLOOD PRESSURE: 120 MMHG | WEIGHT: 104 LBS | DIASTOLIC BLOOD PRESSURE: 70 MMHG | BODY MASS INDEX: 18.42 KG/M2

## 2024-07-10 DIAGNOSIS — Z01.419 ENCOUNTER FOR GYNECOLOGICAL EXAMINATION (GENERAL) (ROUTINE) W/OUT ABNORMAL FINDINGS: ICD-10-CM

## 2024-07-10 LAB
BILIRUB UR QL STRIP: NORMAL
GLUCOSE UR-MCNC: NORMAL
HCG UR QL: 0.2 EU/DL
HCG UR QL: NEGATIVE
HGB UR QL STRIP.AUTO: NORMAL
KETONES UR-MCNC: NORMAL
LEUKOCYTE ESTERASE UR QL STRIP: NORMAL
NITRITE UR QL STRIP: NORMAL
PH UR STRIP: 7
PROT UR STRIP-MCNC: NORMAL

## 2024-07-10 PROCEDURE — 81003 URINALYSIS AUTO W/O SCOPE: CPT | Mod: QW

## 2024-07-10 PROCEDURE — 81025 URINE PREGNANCY TEST: CPT

## 2024-07-10 PROCEDURE — 99459 PELVIC EXAMINATION: CPT

## 2024-07-10 PROCEDURE — 99395 PREV VISIT EST AGE 18-39: CPT

## 2024-07-11 LAB
C TRACH RRNA SPEC QL NAA+PROBE: NOT DETECTED
HPV HIGH+LOW RISK DNA PNL CVX: NOT DETECTED
N GONORRHOEA RRNA SPEC QL NAA+PROBE: NOT DETECTED
SOURCE TP AMPLIFICATION: NORMAL

## 2024-07-17 LAB — CYTOLOGY CVX/VAG DOC THIN PREP: NORMAL

## 2024-07-26 DIAGNOSIS — Z78.9 OTHER SPECIFIED HEALTH STATUS: ICD-10-CM

## 2024-07-26 RX ORDER — NORETHINDRONE ACETATE AND ETHINYL ESTRADIOL, ETHINYL ESTRADIOL AND FERROUS FUMARATE 1MG-10(24)
1 MG-10 MCG / KIT ORAL DAILY
Qty: 1 | Refills: 5 | Status: ACTIVE | COMMUNITY
Start: 2024-07-26 | End: 1900-01-01

## 2024-08-07 ENCOUNTER — APPOINTMENT (OUTPATIENT)
Dept: OBGYN | Facility: CLINIC | Age: 39
End: 2024-08-07

## 2024-08-07 PROCEDURE — 81003 URINALYSIS AUTO W/O SCOPE: CPT | Mod: QW
